# Patient Record
Sex: MALE | Race: WHITE | NOT HISPANIC OR LATINO | Employment: OTHER | URBAN - METROPOLITAN AREA
[De-identification: names, ages, dates, MRNs, and addresses within clinical notes are randomized per-mention and may not be internally consistent; named-entity substitution may affect disease eponyms.]

---

## 2018-11-05 ENCOUNTER — OFFICE VISIT (OUTPATIENT)
Dept: MEDICAL GROUP | Facility: MEDICAL CENTER | Age: 63
End: 2018-11-05
Payer: MEDICARE

## 2018-11-05 VITALS
SYSTOLIC BLOOD PRESSURE: 142 MMHG | OXYGEN SATURATION: 95 % | WEIGHT: 192.24 LBS | BODY MASS INDEX: 26.91 KG/M2 | DIASTOLIC BLOOD PRESSURE: 90 MMHG | RESPIRATION RATE: 20 BRPM | HEART RATE: 94 BPM | TEMPERATURE: 98.4 F | HEIGHT: 71 IN

## 2018-11-05 DIAGNOSIS — M79.642 PAIN IN BOTH HANDS: ICD-10-CM

## 2018-11-05 DIAGNOSIS — R01.1 MURMUR: ICD-10-CM

## 2018-11-05 DIAGNOSIS — R03.0 TRANSIENT ELEVATED BLOOD PRESSURE: ICD-10-CM

## 2018-11-05 DIAGNOSIS — Z12.11 SCREENING FOR COLORECTAL CANCER: ICD-10-CM

## 2018-11-05 DIAGNOSIS — Z13.1 SCREENING FOR DIABETES MELLITUS: ICD-10-CM

## 2018-11-05 DIAGNOSIS — Z72.89 OTHER PROBLEMS RELATED TO LIFESTYLE: ICD-10-CM

## 2018-11-05 DIAGNOSIS — K21.9 GASTROESOPHAGEAL REFLUX DISEASE WITHOUT ESOPHAGITIS: ICD-10-CM

## 2018-11-05 DIAGNOSIS — M79.641 PAIN IN BOTH HANDS: ICD-10-CM

## 2018-11-05 DIAGNOSIS — Z12.5 SCREENING FOR PROSTATE CANCER: ICD-10-CM

## 2018-11-05 DIAGNOSIS — Z12.12 SCREENING FOR COLORECTAL CANCER: ICD-10-CM

## 2018-11-05 DIAGNOSIS — Z23 NEED FOR VACCINATION: ICD-10-CM

## 2018-11-05 DIAGNOSIS — M72.0 DUPUYTREN'S CONTRACTURE: ICD-10-CM

## 2018-11-05 DIAGNOSIS — Z12.11 COLON CANCER SCREENING: ICD-10-CM

## 2018-11-05 DIAGNOSIS — Z13.6 SCREENING FOR ISCHEMIC HEART DISEASE: ICD-10-CM

## 2018-11-05 DIAGNOSIS — Z00.00 HEALTHCARE MAINTENANCE: ICD-10-CM

## 2018-11-05 PROBLEM — M79.643 HAND PAIN: Status: ACTIVE | Noted: 2018-11-05

## 2018-11-05 PROCEDURE — 90670 PCV13 VACCINE IM: CPT | Performed by: FAMILY MEDICINE

## 2018-11-05 PROCEDURE — 90715 TDAP VACCINE 7 YRS/> IM: CPT | Performed by: FAMILY MEDICINE

## 2018-11-05 PROCEDURE — 90472 IMMUNIZATION ADMIN EACH ADD: CPT | Performed by: FAMILY MEDICINE

## 2018-11-05 PROCEDURE — G0009 ADMIN PNEUMOCOCCAL VACCINE: HCPCS | Performed by: FAMILY MEDICINE

## 2018-11-05 PROCEDURE — 99204 OFFICE O/P NEW MOD 45 MIN: CPT | Mod: 25 | Performed by: FAMILY MEDICINE

## 2018-11-05 RX ORDER — RANITIDINE 150 MG/1
150 TABLET ORAL 2 TIMES DAILY
COMMUNITY
End: 2019-07-17

## 2018-11-05 ASSESSMENT — PATIENT HEALTH QUESTIONNAIRE - PHQ9: CLINICAL INTERPRETATION OF PHQ2 SCORE: 0

## 2018-11-05 NOTE — ASSESSMENT & PLAN NOTE
On exam the patient has a 2 out of 6 systolic ejection murmur that does not radiate to the carotid arteries.  There is no known chest pain, shortness of breath or palpitations.  Patient has never been told that he has a murmur

## 2018-11-05 NOTE — PROGRESS NOTES
[facility]  Progress Note  New Patient    Subjective:   Brennan Farooq is a 63 y.o. male here today with a chief complaint of hand pain. This is a new patient to me. The patient comes in alone.     Healthcare maintenance  Lipids: Ordered.  Fasting Glucose: Ordered.  Hepatitis C Screen: Ordered.  Colonoscopy: Declined.  Fit test ordered.  PSA: Risks/benefits discussed.  Ordered.    Flu vaccine: Patient declines.   Pneumonia vaccine: Prevnar 13 given 11/05/18.   Tdap: given 11/05/18.   Shingrix vaccine: Recommended, patient will get at pharmacy.    Dupuytren's contracture  Patient describes a fibrous band in both hands that sometimes causes flexion of the fourth and fifth digits.  It is not bothersome to him.    Gastroesophageal reflux disease without esophagitis  Patient has long-standing history of reflux that responds well to as needed Zantac.  He states he has had an EGD done in the past which had a negative biopsy.    Hand pain  The patient has a several month long history of bilateral hand pain localized to the DIP and PIP joints.  Sometimes he will also awaken in the morning with some numbness in his bilateral fingertips.  This resolves as the day goes on.  Symptoms are mild in severity and the patient has not tried any alleviating factors.  He cannot identify any exacerbating factors.  There is no known trauma.    Murmur  On exam the patient has a 2 out of 6 systolic ejection murmur that does not radiate to the carotid arteries.  There is no known chest pain, shortness of breath or palpitations.  Patient has never been told that he has a murmur    Transient elevated blood pressure  Several months ago the patient was told that he had a borderline blood pressure.  He does not check at home.  He denies chest pain, shortness of breath and palpitations, he does not exercise much.      No current outpatient prescriptions on file prior to visit.     No current facility-administered medications on file  "prior to visit.        Past Medical History:   Diagnosis Date   • GERD (gastroesophageal reflux disease)        Allergies: Patient has no known allergies.    Surgical History:  has no past surgical history on file.    Family History: family history includes Alcohol/Drug in his father and mother; Arthritis in his mother; Hypertension in his father.    Social History:  reports that he has never smoked. He has never used smokeless tobacco. He reports that he drinks alcohol. He reports that he does not use drugs.    ROS:   Constitutional ROS: No unexplained fevers, sweats, or chills  Eye ROS: No eye pain, redness, discharge  Ear ROS: No ear pain  Mouth/Throat ROS: No sore throat  Neck ROS: No swollen glands  Pulmonary ROS: No shortness of breath  Cardiovascular ROS: No chest pain  Gastrointestinal ROS: No abdominal pain  Musculoskeletal/Extremities ROS: No clubbing  Skin/Integumentary ROS: No evidence of rash  Neurologic ROS: No seizures  Psychiatric ROS: No depression       Objective:     Vitals:    11/05/18 0815 11/05/18 0821   BP: 142/90 142/90   Patient Position: Sitting    Pulse: 94    Resp: 20    Temp: 36.9 °C (98.4 °F)    TempSrc: Temporal    SpO2: 95%    Weight: 87.2 kg (192 lb 3.9 oz)    Height: 1.791 m (5' 10.5\")        Physical Exam:  Constitutional: Alert, no distress.  Skin: Warm, dry, good turgor, no rashes in visible areas.  Eye: Equal, round and reactive, conjunctiva clear, lids normal.  ENMT: Lips without lesions, good dentition, oropharynx clear.  Neck: Trachea midline, no masses, no thyromegaly. No cervical or supraclavicular lymphadenopathy  Respiratory: Unlabored respiratory effort, lungs clear to auscultation, no wheezes, no ronchi.  Cardiovascular: 2+ EVE loudest at aortic region.  Abdomen: Soft, non-tender, no masses, no hepatosplenomegaly.  Psych: Alert and oriented x3, normal affect and mood.  MSK: bilateral wrists with negative Phalen and Tinel sign.  Dupuytren's contracture identified " bilaterally.  Sensation intact in bilateral upper extremities.  2+ radial pulses bilaterally        Assessment and Plan:     1. Transient elevated blood pressure  - DASH diet discussed, handout given.   - f/u 3 months.     2. Gastroesophageal reflux disease without esophagitis  - continue PRN zantac.     3. Healthcare maintenance  - see HPI.     4. Dupuytren's contracture  - watchful waiting, consider hand surgery if bothersome.,     5. Pain in both hands  No trauma to warrant XR. Suspect carpal tunnel and arthritis.   - discussed hand exercises/stretching.   - carpal tunnel brace at night.   - f/u 3 months.     6. Colon cancer screening  - OCCULT BLOOD FECES IMMUNOASSAY; Future    7. Screening for ischemic heart disease  - LIPID PROFILE; Future    8. Screening for diabetes mellitus  - BASIC METABOLIC PANEL; Future    9. Screening for prostate cancer  - PROSTATE SPECIFIC AG SCREENING; Future    10. Other problems related to lifestyle  - HEP C VIRUS ANTIBODY; Future    11. Need for vaccination  - Tdap Vaccine =>6YO IM  - Pneumococcal Conjugate Vaccine 13-Valent    12. Murmur  - EC-ECHOCARDIOGRAM COMPLETE W/O CONT; Future          Followup: Return in about 3 months (around 2/5/2019).

## 2018-11-05 NOTE — ASSESSMENT & PLAN NOTE
Patient has long-standing history of reflux that responds well to as needed Zantac.  He states he has had an EGD done in the past which had a negative biopsy.

## 2018-11-05 NOTE — ASSESSMENT & PLAN NOTE
Several months ago the patient was told that he had a borderline blood pressure.  He does not check at home.  He denies chest pain, shortness of breath and palpitations, he does not exercise much.

## 2018-11-05 NOTE — ASSESSMENT & PLAN NOTE
Patient describes a fibrous band in both hands that sometimes causes flexion of the fourth and fifth digits.  It is not bothersome to him.

## 2018-11-05 NOTE — ASSESSMENT & PLAN NOTE
Lipids: Ordered.  Fasting Glucose: Ordered.  Hepatitis C Screen: Ordered.  Colonoscopy: Declined.  Fit test ordered.  PSA: Risks/benefits discussed.  Ordered.    Flu vaccine: Patient declines.   Pneumonia vaccine: Prevnar 13 given 11/05/18.   Tdap: given 11/05/18.   Shingrix vaccine: Recommended, patient will get at pharmacy.

## 2018-11-07 ENCOUNTER — HOSPITAL ENCOUNTER (OUTPATIENT)
Facility: MEDICAL CENTER | Age: 63
End: 2018-11-07
Attending: FAMILY MEDICINE
Payer: MEDICARE

## 2018-11-07 ENCOUNTER — HOSPITAL ENCOUNTER (OUTPATIENT)
Dept: LAB | Facility: MEDICAL CENTER | Age: 63
End: 2018-11-07
Attending: FAMILY MEDICINE
Payer: MEDICARE

## 2018-11-07 DIAGNOSIS — Z13.1 SCREENING FOR DIABETES MELLITUS: ICD-10-CM

## 2018-11-07 DIAGNOSIS — Z12.5 SCREENING FOR PROSTATE CANCER: ICD-10-CM

## 2018-11-07 DIAGNOSIS — Z72.89 OTHER PROBLEMS RELATED TO LIFESTYLE: ICD-10-CM

## 2018-11-07 DIAGNOSIS — Z13.6 SCREENING FOR ISCHEMIC HEART DISEASE: ICD-10-CM

## 2018-11-07 PROCEDURE — 86803 HEPATITIS C AB TEST: CPT

## 2018-11-07 PROCEDURE — 36415 COLL VENOUS BLD VENIPUNCTURE: CPT

## 2018-11-07 PROCEDURE — 80061 LIPID PANEL: CPT

## 2018-11-07 PROCEDURE — 84153 ASSAY OF PSA TOTAL: CPT

## 2018-11-07 PROCEDURE — 80048 BASIC METABOLIC PNL TOTAL CA: CPT

## 2018-11-07 PROCEDURE — 82274 ASSAY TEST FOR BLOOD FECAL: CPT

## 2018-11-08 ENCOUNTER — TELEPHONE (OUTPATIENT)
Dept: MEDICAL GROUP | Facility: MEDICAL CENTER | Age: 63
End: 2018-11-08

## 2018-11-08 DIAGNOSIS — E78.5 DYSLIPIDEMIA: ICD-10-CM

## 2018-11-08 LAB
ANION GAP SERPL CALC-SCNC: 5 MMOL/L (ref 0–11.9)
BUN SERPL-MCNC: 16 MG/DL (ref 8–22)
CALCIUM SERPL-MCNC: 10.2 MG/DL (ref 8.5–10.5)
CHLORIDE SERPL-SCNC: 104 MMOL/L (ref 96–112)
CHOLEST SERPL-MCNC: 180 MG/DL (ref 100–199)
CO2 SERPL-SCNC: 28 MMOL/L (ref 20–33)
CREAT SERPL-MCNC: 1.09 MG/DL (ref 0.5–1.4)
FASTING STATUS PATIENT QL REPORTED: NORMAL
GLUCOSE SERPL-MCNC: 107 MG/DL (ref 65–99)
HCV AB SER QL: NEGATIVE
HDLC SERPL-MCNC: 53 MG/DL
LDLC SERPL CALC-MCNC: 115 MG/DL
POTASSIUM SERPL-SCNC: 4.5 MMOL/L (ref 3.6–5.5)
PSA SERPL-MCNC: 0.1 NG/ML (ref 0–4)
SODIUM SERPL-SCNC: 137 MMOL/L (ref 135–145)
TRIGL SERPL-MCNC: 59 MG/DL (ref 0–149)

## 2018-11-08 RX ORDER — SIMVASTATIN 20 MG
20 TABLET ORAL EVERY EVENING
Qty: 90 TAB | Refills: 4 | Status: SHIPPED | OUTPATIENT
Start: 2018-11-08 | End: 2019-05-15 | Stop reason: SDUPTHER

## 2018-11-08 NOTE — TELEPHONE ENCOUNTER
Phone Number Called: [phone] (home)     Message: Spoke with Pt. And he would like to start the Statin Drug. I also informed him to start taking Aspirin 81 mg daily.  Pharmacy is on file.    Left Message for patient to call back: yes

## 2018-11-08 NOTE — TELEPHONE ENCOUNTER
Simvastatin ordered. Please advise patient to get NON-fasting labs 2-4 weeks after starting simvastatin to ensure liver remains healthy. Order placed.

## 2018-11-08 NOTE — TELEPHONE ENCOUNTER
Phone Number Called: [phone] (home)     Message: Spoke with Pt. And he verbalized his understanding with getting the labs done 2-4 weeks after starting the Simvastatin.    Left Message for patient to call back: N\A

## 2018-11-08 NOTE — TELEPHONE ENCOUNTER
----- Message from Mac Gentile M.D. sent at 11/8/2018  8:08 AM PST -----  Please call and inform this patient of the following:  His cholesterol and blood sugar is slightly up. He should continue to work on diet and exercise. I would also recommend that the patient start a baby aspirin (81 mg) daily and a statin medicine. Would he like to start this medicine?

## 2018-11-10 DIAGNOSIS — Z12.11 COLON CANCER SCREENING: ICD-10-CM

## 2018-11-15 ENCOUNTER — HOSPITAL ENCOUNTER (OUTPATIENT)
Dept: CARDIOLOGY | Facility: MEDICAL CENTER | Age: 63
End: 2018-11-15
Attending: FAMILY MEDICINE
Payer: MEDICARE

## 2018-11-15 ENCOUNTER — TELEPHONE (OUTPATIENT)
Dept: MEDICAL GROUP | Facility: MEDICAL CENTER | Age: 63
End: 2018-11-15

## 2018-11-15 DIAGNOSIS — R01.1 MURMUR: ICD-10-CM

## 2018-11-15 LAB
HEMOCCULT STL QL IA: NEGATIVE
LV EJECT FRACT  99904: 60
LV EJECT FRACT MOD 2C 99903: 60.54
LV EJECT FRACT MOD 4C 99902: 60.2
LV EJECT FRACT MOD BP 99901: 61

## 2018-11-15 PROCEDURE — 93306 TTE W/DOPPLER COMPLETE: CPT | Mod: 26 | Performed by: INTERNAL MEDICINE

## 2018-11-15 PROCEDURE — 93306 TTE W/DOPPLER COMPLETE: CPT

## 2018-11-15 NOTE — TELEPHONE ENCOUNTER
----- Message from Mac Gentile M.D. sent at 11/15/2018 11:36 AM PST -----  Please call and inform this patient of the following:  Your stool test for blood was negative. This is great news.     Please don't hesitate to call or write with any concerns.     Mac Gentile M.D.

## 2018-11-15 NOTE — TELEPHONE ENCOUNTER
Phone Number Called: [phone] (home)     Message: Pt. Has been informed of his test results. He also wanted to let you know he had an EKG done this morning at a [facility].    Left Message for patient to call back: N\A

## 2018-11-16 ENCOUNTER — TELEPHONE (OUTPATIENT)
Dept: MEDICAL GROUP | Facility: MEDICAL CENTER | Age: 63
End: 2018-11-16

## 2018-11-16 NOTE — TELEPHONE ENCOUNTER
Phone Number Called: [phone] (home)       Message: left a VM for a return call.    Left Message for patient to call back: yes

## 2018-11-16 NOTE — TELEPHONE ENCOUNTER
----- Message from Mac Gentile M.D. sent at 11/16/2018  7:54 AM PST -----  Please call and inform this patient of the following:  His echo showed moderate aortic stenosis. This is a slight narrowing of one of the valves leading out of the heart. We should repeat his echo in 6 months. Otherwise, the echo looked good.

## 2018-11-26 ENCOUNTER — HOSPITAL ENCOUNTER (OUTPATIENT)
Dept: LAB | Facility: MEDICAL CENTER | Age: 63
End: 2018-11-26
Attending: FAMILY MEDICINE
Payer: MEDICARE

## 2018-11-26 DIAGNOSIS — E78.5 DYSLIPIDEMIA: ICD-10-CM

## 2018-11-26 PROCEDURE — 36415 COLL VENOUS BLD VENIPUNCTURE: CPT

## 2018-11-26 PROCEDURE — 80076 HEPATIC FUNCTION PANEL: CPT

## 2018-11-28 ENCOUNTER — TELEPHONE (OUTPATIENT)
Dept: MEDICAL GROUP | Facility: MEDICAL CENTER | Age: 63
End: 2018-11-28

## 2018-11-28 LAB
ALBUMIN SERPL BCP-MCNC: 4.6 G/DL (ref 3.2–4.9)
ALP SERPL-CCNC: 45 U/L (ref 30–99)
ALT SERPL-CCNC: 13 U/L (ref 2–50)
AST SERPL-CCNC: 16 U/L (ref 12–45)
BILIRUB CONJ SERPL-MCNC: <0.1 MG/DL (ref 0.1–0.5)
BILIRUB INDIRECT SERPL-MCNC: NORMAL MG/DL (ref 0–1)
BILIRUB SERPL-MCNC: 0.5 MG/DL (ref 0.1–1.5)
PROT SERPL-MCNC: 7.3 G/DL (ref 6–8.2)

## 2018-11-28 NOTE — TELEPHONE ENCOUNTER
Phone Number Called: [phone] (home)     Message: spoke with pt. And informed him of his test results.    Left Message for patient to call back: yes and N\A

## 2018-11-28 NOTE — TELEPHONE ENCOUNTER
----- Message from Mac Gentile M.D. sent at 11/28/2018  7:48 AM PST -----  Please call and inform this patient of the following:  His liver function tests are normal.

## 2018-12-04 ENCOUNTER — OFFICE VISIT (OUTPATIENT)
Dept: MEDICAL GROUP | Facility: MEDICAL CENTER | Age: 63
End: 2018-12-04
Payer: MEDICARE

## 2018-12-04 VITALS
DIASTOLIC BLOOD PRESSURE: 90 MMHG | OXYGEN SATURATION: 97 % | HEART RATE: 64 BPM | TEMPERATURE: 99 F | SYSTOLIC BLOOD PRESSURE: 172 MMHG | BODY MASS INDEX: 27.27 KG/M2 | WEIGHT: 194.8 LBS | HEIGHT: 71 IN

## 2018-12-04 DIAGNOSIS — H69.93 DYSFUNCTION OF BOTH EUSTACHIAN TUBES: ICD-10-CM

## 2018-12-04 PROCEDURE — 99214 OFFICE O/P EST MOD 30 MIN: CPT | Performed by: PHYSICIAN ASSISTANT

## 2018-12-04 RX ORDER — FLUTICASONE PROPIONATE 50 MCG
1 SPRAY, SUSPENSION (ML) NASAL DAILY
Qty: 16 G | Refills: 0 | Status: SHIPPED | OUTPATIENT
Start: 2018-12-04 | End: 2019-04-01

## 2018-12-07 NOTE — PROGRESS NOTES
"Subjective:      Brennan Farooq is a 63 y.o. male who presents with Ear Fullness (ears keep popping) and Pressure Behind the Eyes            HPIPatient presents with complaint of ear fullness/pressure. Sometimes worse than others, feels like they need to pop. Not necessarily noticed changing elevation. No headache. No dizziness. No tinnitus. Unsure if there is hearing loss. No meds or treatments tried. Feels some pressure behind right and left eye. No fever/chills. No vision change. No hx of chronic sinus infections.     ROSno fever/chills. No headache/dizziness. No neck pain or stiffness. No chest pain, SOB or difficulty breathing. No n/v/d/c or abdominal pain. No rash or skin lesion. No joint swelling or redness.    Active Ambulatory Problems     Diagnosis Date Noted   • Gastroesophageal reflux disease without esophagitis 11/05/2018   • Healthcare maintenance 11/05/2018   • Dupuytren's contracture 11/05/2018   • Hand pain 11/05/2018   • Murmur 11/05/2018   • Transient elevated blood pressure 11/05/2018   • Dyslipidemia 11/08/2018     Resolved Ambulatory Problems     Diagnosis Date Noted   • No Resolved Ambulatory Problems     Past Medical History:   Diagnosis Date   • GERD (gastroesophageal reflux disease)      Current Outpatient Prescriptions   Medication Sig Dispense Refill   • fluticasone (FLONASE) 50 MCG/ACT nasal spray Spray 1 Spray in nose every day. 16 g 0   • simvastatin (ZOCOR) 20 MG Tab Take 1 Tab by mouth every evening. 90 Tab 4   • aspirin EC (ECOTRIN) 81 MG Tablet Delayed Response Take 1 Tab by mouth every day. 90 Tab 4   • raNITidine (ZANTAC) 150 MG Tab Take 150 mg by mouth 2 times a day.       No current facility-administered medications for this visit.    nkda  Non-smoker   Objective:     BP (!) 172/90 (BP Location: Right arm, Patient Position: Sitting)   Pulse 64   Temp 37.2 °C (99 °F) (Temporal)   Ht 1.791 m (5' 10.5\")   Wt 88.4 kg (194 lb 12.8 oz)   SpO2 97%   BMI 27.56 kg/m²  "     Physical Exam   Constitutional: He is oriented to person, place, and time. He appears well-developed and well-nourished. No distress.   HENT:   Head: Normocephalic and atraumatic.   Right Ear: Hearing, tympanic membrane, external ear and ear canal normal.   Left Ear: Hearing, tympanic membrane, external ear and ear canal normal.   Nose: Mucosal edema and rhinorrhea present. Right sinus exhibits no maxillary sinus tenderness and no frontal sinus tenderness. Left sinus exhibits no maxillary sinus tenderness and no frontal sinus tenderness.   Mouth/Throat: Uvula is midline and oropharynx is clear and moist.   Eyes: Pupils are equal, round, and reactive to light. Conjunctivae and EOM are normal.   Cardiovascular: Normal rate and regular rhythm.    Pulmonary/Chest: Effort normal and breath sounds normal.   Neurological: He is alert and oriented to person, place, and time.   Skin: Skin is warm and dry. No rash noted. He is not diaphoretic. No pallor.   Psychiatric: He has a normal mood and affect. His behavior is normal. Judgment and thought content normal.   Vitals reviewed.              Assessment/Plan:     1. Dysfunction of both eustachian tubes    - fluticasone (FLONASE) 50 MCG/ACT nasal spray; Spray 1 Spray in nose every day.  Dispense: 16 g; Refill: 0    F/u if symptoms persist. May need referral to ENT

## 2018-12-18 ENCOUNTER — OFFICE VISIT (OUTPATIENT)
Dept: MEDICAL GROUP | Facility: MEDICAL CENTER | Age: 63
End: 2018-12-18
Payer: MEDICARE

## 2018-12-18 VITALS
HEIGHT: 71 IN | HEART RATE: 77 BPM | RESPIRATION RATE: 18 BRPM | TEMPERATURE: 98.3 F | WEIGHT: 194.6 LBS | BODY MASS INDEX: 27.24 KG/M2 | DIASTOLIC BLOOD PRESSURE: 84 MMHG | SYSTOLIC BLOOD PRESSURE: 144 MMHG | OXYGEN SATURATION: 96 %

## 2018-12-18 DIAGNOSIS — R73.01 IMPAIRED FASTING GLUCOSE: ICD-10-CM

## 2018-12-18 DIAGNOSIS — I35.0 NONRHEUMATIC AORTIC VALVE STENOSIS: ICD-10-CM

## 2018-12-18 DIAGNOSIS — H69.93 DYSFUNCTION OF BOTH EUSTACHIAN TUBES: ICD-10-CM

## 2018-12-18 DIAGNOSIS — M79.641 PAIN IN BOTH HANDS: ICD-10-CM

## 2018-12-18 DIAGNOSIS — I10 ESSENTIAL HYPERTENSION: ICD-10-CM

## 2018-12-18 DIAGNOSIS — M79.642 PAIN IN BOTH HANDS: ICD-10-CM

## 2018-12-18 PROBLEM — R01.1 MURMUR: Status: RESOLVED | Noted: 2018-11-05 | Resolved: 2018-12-18

## 2018-12-18 PROCEDURE — 99214 OFFICE O/P EST MOD 30 MIN: CPT | Performed by: FAMILY MEDICINE

## 2018-12-18 RX ORDER — AMLODIPINE BESYLATE 2.5 MG/1
2.5 TABLET ORAL DAILY
Qty: 90 TAB | Refills: 0 | Status: SHIPPED | OUTPATIENT
Start: 2018-12-18 | End: 2019-03-12 | Stop reason: SDUPTHER

## 2018-12-18 NOTE — ASSESSMENT & PLAN NOTE
Patient describes several weeks of bilateral ear popping and sinus pressure, along with a sinus headache.  He has tried Flonase which helped sometimes and did not help others.

## 2018-12-19 NOTE — PROGRESS NOTES
[facility]  Progress Note  Established Patient    Subjective:   Brennan Farooq is a 63 y.o. male here today with a chief complaint of high blood pressure. The patient is alone.     Dysfunction of both eustachian tubes  Patient describes several weeks of bilateral ear popping and sinus pressure, along with a sinus headache.  He has tried Flonase which helped sometimes and did not help others.    Aortic stenosis  Patient has moderate aortic stenosis identified on 11/2018 echocardiogram.    Essential hypertension  Patient's blood pressure remains elevated.    Hand pain  This has improved with nighttime bracing.    Impaired fasting glucose  Noted on past labs.      Current Outpatient Prescriptions on File Prior to Visit   Medication Sig Dispense Refill   • simvastatin (ZOCOR) 20 MG Tab Take 1 Tab by mouth every evening. 90 Tab 4   • aspirin EC (ECOTRIN) 81 MG Tablet Delayed Response Take 1 Tab by mouth every day. 90 Tab 4   • raNITidine (ZANTAC) 150 MG Tab Take 150 mg by mouth 2 times a day.     • fluticasone (FLONASE) 50 MCG/ACT nasal spray Spray 1 Spray in nose every day. 16 g 0     No current facility-administered medications on file prior to visit.        Past Medical History:   Diagnosis Date   • GERD (gastroesophageal reflux disease)        Allergies: Patient has no known allergies.    Surgical History:  has no past surgical history on file.    Family History: family history includes Alcohol/Drug in his father and mother; Arthritis in his mother; Hypertension in his father.    Social History:  reports that he has never smoked. He has never used smokeless tobacco. He reports that he drinks alcohol. He reports that he does not use drugs.    ROS: no fever or nausea.        Objective:     Vitals:    12/18/18 1521   BP: 144/84   BP Location: Right arm   Patient Position: Sitting   BP Cuff Size: Large adult   Pulse: 77   Resp: 18   Temp: 36.8 °C (98.3 °F)   TempSrc: Temporal   SpO2: 96%   Weight: 88.3 kg  "(194 lb 9.6 oz)   Height: 1.791 m (5' 10.5\")       Physical Exam:  General: alert in no apparent distress.   Cardio: regular rate and rhythm, no murmurs, rubs or gallops.   Resp: CTAB no w/r/r.   ENMT: Cerumen impaction.  Status post bilateral ear lavage demonstrates intact tympanic membranes bilaterally.  No pharyngeal erythema, no tonsillar exudates.  Uvula midline.        Assessment and Plan:     1. Dysfunction of both eustachian tubes  Patient's description is most consistent with eustachian tube dysfunction and sinusitis.  Recommended Neti pot and Nasacort and we will reevaluate.  Patient will call with new or worsening symptoms.    2. Essential hypertension  Starting medicine.   - amLODIPine (NORVASC) 2.5 MG Tab; Take 1 Tab by mouth every day.  Dispense: 90 Tab; Refill: 0    3. Nonrheumatic aortic valve stenosis  - repeat echo 5/2019.     4. Impaired fasting glucose  - diet and exercise.     5. Pain in both hands  - improved.        Followup: Return in about 6 weeks (around 1/29/2019), or if symptoms worsen or fail to improve, for HTN.         "

## 2018-12-27 ENCOUNTER — TELEPHONE (OUTPATIENT)
Dept: MEDICAL GROUP | Facility: MEDICAL CENTER | Age: 63
End: 2018-12-27

## 2018-12-27 NOTE — TELEPHONE ENCOUNTER
Phone Number Called: [phone] (home)     Message: Pt. Called wanting to know if her can still take his Aspirin 81 mg. Checked notes and it looks like nothing was stated for pt. To stop Aspirin.    Left Message for patient to call back: N\A

## 2018-12-31 NOTE — TELEPHONE ENCOUNTER
In his circumstances, the benefits of baby aspirin daily probably outweigh the risks; therefore, I would recommend he continue baby aspirin daily with food.

## 2019-01-14 ENCOUNTER — PATIENT OUTREACH (OUTPATIENT)
Dept: HEALTH INFORMATION MANAGEMENT | Facility: OTHER | Age: 64
End: 2019-01-14

## 2019-01-14 NOTE — PROGRESS NOTES
Pt called and the [facility] appt was rescheduled for a 40 minutes appt. Pt stated that he will schedule the AWV after his FV. Not able to go over more info. Pt was in a hurry.

## 2019-03-12 DIAGNOSIS — I10 ESSENTIAL HYPERTENSION: ICD-10-CM

## 2019-03-12 RX ORDER — AMLODIPINE BESYLATE 2.5 MG/1
2.5 TABLET ORAL DAILY
Qty: 90 TAB | Refills: 0 | Status: SHIPPED | OUTPATIENT
Start: 2019-03-12 | End: 2019-06-05 | Stop reason: SDUPTHER

## 2019-03-21 ENCOUNTER — TELEPHONE (OUTPATIENT)
Dept: MEDICAL GROUP | Facility: MEDICAL CENTER | Age: 64
End: 2019-03-21

## 2019-03-21 NOTE — TELEPHONE ENCOUNTER
ESTABLISHED PATIENT PRE-VISIT PLANNING     Patient was NOT contacted to complete PVP.     Note: Patient will not be contacted if there is no indication to call.     1.  Reviewed notes from the last few office visits within the medical group: Yes    2.  If any orders were placed at last visit or intended to be done for this visit (i.e. 6 mos follow-up), do we have Results/Consult Notes?        •  Labs - Labs were not ordered at last office visit.   Note: If patient appointment is for lab review and patient did not complete labs, check with provider if OK to reschedule patient until labs completed.       •  Imaging - Imaging was not ordered at last office visit.       •  Referrals - No referrals were ordered at last office visit.    3. Is this appointment scheduled as a Hospital Follow-Up? No    4.  Immunizations were updated in Epic using WebIZ?: Epic matches WebIZ       •  Web Iz Recommendations: FLU, TD, VARICELLA (Chicken Pox)  and SHINGRIX (Shingles)    5.  Patient is due for the following Health Maintenance Topics:   Health Maintenance Due   Topic Date Due   • Annual Wellness Visit  1955   • IMM ZOSTER VACCINES (1 of 2) 10/01/2003   • IMM INFLUENZA (1) 09/01/2018       - Patient is up-to-date on all Health Maintenance topics. No records have been requested at this time.    6. Orders for overdue Health Maintenance topics pended in Pre-Charting? NO    7.  AHA (MDX) form printed for Provider? YES    8.  Patient was NOT informed to arrive 15 min prior to their scheduled appointment and bring in their medication bottles.

## 2019-04-01 ENCOUNTER — OFFICE VISIT (OUTPATIENT)
Dept: MEDICAL GROUP | Facility: MEDICAL CENTER | Age: 64
End: 2019-04-01
Payer: MEDICARE

## 2019-04-01 VITALS
OXYGEN SATURATION: 97 % | WEIGHT: 197 LBS | BODY MASS INDEX: 27.58 KG/M2 | RESPIRATION RATE: 18 BRPM | DIASTOLIC BLOOD PRESSURE: 72 MMHG | HEART RATE: 75 BPM | HEIGHT: 71 IN | TEMPERATURE: 98.2 F | SYSTOLIC BLOOD PRESSURE: 132 MMHG

## 2019-04-01 DIAGNOSIS — E78.5 DYSLIPIDEMIA: ICD-10-CM

## 2019-04-01 DIAGNOSIS — M54.2 NECK PAIN: ICD-10-CM

## 2019-04-01 DIAGNOSIS — Z13.6 SCREENING FOR ISCHEMIC HEART DISEASE: ICD-10-CM

## 2019-04-01 DIAGNOSIS — Z12.5 SCREENING FOR PROSTATE CANCER: ICD-10-CM

## 2019-04-01 DIAGNOSIS — I10 ESSENTIAL HYPERTENSION: ICD-10-CM

## 2019-04-01 DIAGNOSIS — Z13.1 SCREENING FOR DIABETES MELLITUS: ICD-10-CM

## 2019-04-01 DIAGNOSIS — I35.0 NONRHEUMATIC AORTIC VALVE STENOSIS: ICD-10-CM

## 2019-04-01 DIAGNOSIS — H69.93 DYSFUNCTION OF BOTH EUSTACHIAN TUBES: ICD-10-CM

## 2019-04-01 PROCEDURE — 99214 OFFICE O/P EST MOD 30 MIN: CPT | Mod: 25 | Performed by: FAMILY MEDICINE

## 2019-04-01 PROCEDURE — 8041 PR SCP AHA: Performed by: FAMILY MEDICINE

## 2019-04-01 ASSESSMENT — PATIENT HEALTH QUESTIONNAIRE - PHQ9: CLINICAL INTERPRETATION OF PHQ2 SCORE: 0

## 2019-04-01 NOTE — ASSESSMENT & PLAN NOTE
"Patient states that he has had several months of diffuse neck pain that is worse on the right.  He describes some popping and clicking with neck rotation.  He describes the pain as \"tightness\".  It comes and goes.  He has not really tried anything for it.  "

## 2019-04-01 NOTE — PROGRESS NOTES
"Subjective:     Brennan Farooq is a 63 y.o. male here today for neck pain and Annual Health Assessment.    Neck pain  Patient states that he has had several months of diffuse neck pain that is worse on the right.  He describes some popping and clicking with neck rotation.  He describes the pain as \"tightness\".  It comes and goes.  He has not really tried anything for it.    Aortic stenosis  Patient has moderate aortic stenosis identified on 11/2018 echocardiogram.    Dysfunction of both eustachian tubes  The patient's eustachian tube dysfunction is improving, however, he does not like to use Flonase and instead elects to use chewing gum which worked well for him.    Dyslipidemia  Patient's dyslipidemia is controlled with simvastatin.    Essential hypertension  Patient's blood pressure is at goal on amlodipine.      Health Maintenance Summary                Annual Wellness Visit Overdue 1955     IMM ZOSTER VACCINES Overdue 10/1/2003     IMM INFLUENZA Next Due 9/1/2019     IMM PNEUMOCOCCAL 65+ (ADULT) LOW/MEDIUM RISK SERIES Next Due 11/5/2019      Done 11/5/2018 Imm Admin: Pneumococcal Conjugate Vaccine (Prevnar/PCV-13)    COLON CANCER SCREENING ANNUAL FIT Next Due 11/7/2019      Done 11/7/2018 OCCULT BLOOD FECES IMMUNOASSAY    IMM DTaP/Tdap/Td Vaccine Next Due 11/5/2028      Done 11/5/2018 Imm Admin: Tdap Vaccine          Annual Health Assessment Questions:     1.  Are you currently engaging in any exercise or physical activity? Yes    2.  How would you describe your mood or emotional well-being today? good    3.  Have you had any falls in the last year? No    4.  Have you noticed any problems with your balance or had difficulty walking? No    5.  In the last six months have you experienced any leakage of urine? No    6. DPA/Advanced Directive: Patient does not have an Advanced Directive.  A packet and workshop information was given on Advanced Directives.    Current medicines (including changes " "today)  Current Outpatient Prescriptions   Medication Sig Dispense Refill   • amLODIPine (NORVASC) 2.5 MG Tab Take 1 Tab by mouth every day. 90 Tab 0   • simvastatin (ZOCOR) 20 MG Tab Take 1 Tab by mouth every evening. 90 Tab 4   • aspirin EC (ECOTRIN) 81 MG Tablet Delayed Response Take 1 Tab by mouth every day. 90 Tab 4   • raNITidine (ZANTAC) 150 MG Tab Take 150 mg by mouth 2 times a day.       No current facility-administered medications for this visit.        He  has a past medical history of GERD (gastroesophageal reflux disease).    Patient has no known allergies.    He  reports that he has never smoked. He has never used smokeless tobacco. He reports that he drinks alcohol. He reports that he does not use drugs.  Counseling given: Not Answered      ROS   No chest pain, no shortness of breath,no dizziness.     Objective:     Physical Exam:  Blood pressure 132/72, pulse 75, temperature 36.8 °C (98.2 °F), temperature source Temporal, resp. rate 18, height 1.791 m (5' 10.5\"), weight 89.4 kg (197 lb), SpO2 97 %. Body mass index is 27.87 kg/m².   Constitutional: Alert, no distress.  Neck: No tenderness to palpation over the spine.  Spurling's testing negative bilaterally.  Respiratory: Unlabored respiratory effort, lungs clear to auscultation, no wheezes, no rhonchi.  Cardiovascular: Normal S1, S2, no murmur, no edema.      Assessment and Plan:     1. Nonrheumatic aortic valve stenosis  - EC-ECHOCARDIOGRAM COMPLETE W/O CONT; Future    2. Dyslipidemia  - continue statin.  - Lipid Profile; Future    3. Essential hypertension  - continue current regimen.   - Comp Metabolic Panel; Future    4. Neck pain  Exam normal.   - recommended yoga, call if not better within 6 weeks as we could then try PT or acupuncture. If this persists after would then consider imaging.     5. Dysfunction of both eustachian tubes  - improving.     6. Screening for prostate cancer  - PROSTATE SPECIFIC AG SCREENING; Future            Discussion " today about general wellness and lifestyle habits:    · Engage in regular physical activity and social activities.  · Prevent falls and reduce trip hazards; using ambulatory aides, hearing and vision testing if appropriate.  · Steps to improve urinary incontinence.  · Advanced care planning.    Follow-Up: Return in about 6 months (around 10/1/2019), or if symptoms worsen or fail to improve.         PLEASE NOTE: This dictation was created using voice recognition software. I have made every reasonable attempt to correct obvious errors, but I expect that there are errors of grammar and possibly content that I did not discover before finalizing the note.

## 2019-04-01 NOTE — ASSESSMENT & PLAN NOTE
The patient's eustachian tube dysfunction is improving, however, he does not like to use Flonase and instead elects to use chewing gum which worked well for him.

## 2019-05-01 ENCOUNTER — HOSPITAL ENCOUNTER (OUTPATIENT)
Dept: CARDIOLOGY | Facility: MEDICAL CENTER | Age: 64
End: 2019-05-01
Attending: FAMILY MEDICINE
Payer: MEDICARE

## 2019-05-01 DIAGNOSIS — I35.0 NONRHEUMATIC AORTIC VALVE STENOSIS: ICD-10-CM

## 2019-05-01 LAB
LV EJECT FRACT  99904: 60
LV EJECT FRACT MOD 2C 99903: 62.8
LV EJECT FRACT MOD 4C 99902: 75.85
LV EJECT FRACT MOD BP 99901: 70.04

## 2019-05-01 PROCEDURE — 93306 TTE W/DOPPLER COMPLETE: CPT

## 2019-05-01 PROCEDURE — 93306 TTE W/DOPPLER COMPLETE: CPT | Mod: 26 | Performed by: INTERNAL MEDICINE

## 2019-05-02 ENCOUNTER — PATIENT MESSAGE (OUTPATIENT)
Dept: MEDICAL GROUP | Facility: MEDICAL CENTER | Age: 64
End: 2019-05-02

## 2019-05-02 DIAGNOSIS — I71.21 ASCENDING AORTIC ANEURYSM (HCC): ICD-10-CM

## 2019-05-02 DIAGNOSIS — I35.0 AORTIC VALVE STENOSIS, ETIOLOGY OF CARDIAC VALVE DISEASE UNSPECIFIED: ICD-10-CM

## 2019-05-02 DIAGNOSIS — I35.0 NONRHEUMATIC AORTIC VALVE STENOSIS: ICD-10-CM

## 2019-05-07 NOTE — ASSESSMENT & PLAN NOTE
The patient has a several month long history of bilateral hand pain localized to the DIP and PIP joints.  Sometimes he will also awaken in the morning with some numbness in his bilateral fingertips.  This resolves as the day goes on.  Symptoms are mild in severity and the patient has not tried any alleviating factors.  He cannot identify any exacerbating factors.  There is no known trauma.   (4) walks frequently

## 2019-05-16 RX ORDER — SIMVASTATIN 20 MG
20 TABLET ORAL EVERY EVENING
Qty: 100 TAB | Refills: 4 | Status: SHIPPED | OUTPATIENT
Start: 2019-05-16 | End: 2020-06-29

## 2019-07-15 ENCOUNTER — TELEPHONE (OUTPATIENT)
Dept: CARDIOLOGY | Facility: MEDICAL CENTER | Age: 64
End: 2019-07-15

## 2019-07-15 NOTE — TELEPHONE ENCOUNTER
Left voicemail for patient on upcoming appt. With GILBERTO 7/17/19. Need to know if patient has had recent labs, EKG, or has ever seen a cardiologist; and if so where?

## 2019-07-16 ENCOUNTER — TELEPHONE (OUTPATIENT)
Dept: CARDIOLOGY | Facility: MEDICAL CENTER | Age: 64
End: 2019-07-16

## 2019-07-16 NOTE — TELEPHONE ENCOUNTER
Nidhi Ryan, Med Ass't  Alissa Da Silva, Med Ass't   Cc: Josie Barrios, Med Ass't   Phone Number: [phone]             Pt returned your call. Pt has not had recent labs/EKG done. No prior cardiac testing or cardiologist.

## 2019-07-16 NOTE — TELEPHONE ENCOUNTER
LVM for patient about upcoming appointment with GILBERTO 7-. I needed to know if patient has ever seen a cardiologist, if he has had a recent EKG, labs etc.

## 2019-07-17 ENCOUNTER — OFFICE VISIT (OUTPATIENT)
Dept: CARDIOLOGY | Facility: MEDICAL CENTER | Age: 64
End: 2019-07-17
Payer: MEDICARE

## 2019-07-17 VITALS
HEIGHT: 71 IN | OXYGEN SATURATION: 96 % | DIASTOLIC BLOOD PRESSURE: 88 MMHG | WEIGHT: 196 LBS | HEART RATE: 72 BPM | BODY MASS INDEX: 27.44 KG/M2 | SYSTOLIC BLOOD PRESSURE: 148 MMHG

## 2019-07-17 DIAGNOSIS — I10 ESSENTIAL HYPERTENSION: ICD-10-CM

## 2019-07-17 DIAGNOSIS — Z91.89 OTHER SPECIFIED PERSONAL RISK FACTORS, NOT ELSEWHERE CLASSIFIED: ICD-10-CM

## 2019-07-17 DIAGNOSIS — E78.5 DYSLIPIDEMIA: ICD-10-CM

## 2019-07-17 DIAGNOSIS — I35.0 MODERATE AORTIC STENOSIS: ICD-10-CM

## 2019-07-17 LAB — EKG IMPRESSION: NORMAL

## 2019-07-17 PROCEDURE — 93000 ELECTROCARDIOGRAM COMPLETE: CPT | Performed by: INTERNAL MEDICINE

## 2019-07-17 PROCEDURE — 99204 OFFICE O/P NEW MOD 45 MIN: CPT | Performed by: INTERNAL MEDICINE

## 2019-07-17 RX ORDER — AMLODIPINE BESYLATE 5 MG/1
2.5 TABLET ORAL
Qty: 100 TAB | Refills: 3 | Status: SHIPPED | OUTPATIENT
Start: 2019-07-17 | End: 2020-01-15 | Stop reason: SDUPTHER

## 2019-07-17 ASSESSMENT — ENCOUNTER SYMPTOMS
BRUISES/BLEEDS EASILY: 1
NECK PAIN: 1

## 2019-07-17 NOTE — PATIENT INSTRUCTIONS
Please increase your norvasc (also called amlodipine) to 5mg once a day and let me know if after 1 week your blood pressure is consistently >130/85.     Please schedule your coronary calcium score (CT scan).     Please walk or other aerobic exercise 30 minutes 5 days a week.

## 2019-07-17 NOTE — PROGRESS NOTES
Cardiology Initial Consultation Note    Date of note:    7/17/2019    Primary Care Provider: Mac Gentile M.D.  Referring Provider: Mac Gentile M.D.     Patient Name: Brennan Farooq   YOB: 1955  MRN:              [id number]    Chief Complaint: aortic stenosis.     History of Present Illness: Brennan Farooq Jr. is a 63 y.o. male whose current medical problems include hypertension, dyslipidemia, and moderate aortic stenosis who is here for cardiac consultation for aortic stenosis.    Started walking 30 minutes 2-3 times a week.     Patient denies chest pain, palpitations, dyspnea on exertion, pre-syncope, syncope, lower extremity swelling, orthopnea, PND or recent weight gain.           Review of Systems   HENT: Positive for tinnitus.    Hematologic/Lymphatic: Bruises/bleeds easily.   Skin: Positive for rash.   Musculoskeletal: Positive for joint pain and neck pain.     All other systems reviewed and discussed using a comprehensive questionnaire and are negative.       Past Medical History:   Diagnosis Date   • Dyslipidemia    • GERD (gastroesophageal reflux disease)    • Hypertension    • Moderate aortic stenosis          History reviewed. No pertinent surgical history. no previous surgeries.       Current Outpatient Prescriptions   Medication Sig Dispense Refill   • Famotidine (PEPCID PO) Take  by mouth.     • amLODIPine (NORVASC) 5 MG Tab Take 0.5 Tabs by mouth every day. 100 Tab 3   • simvastatin (ZOCOR) 20 MG Tab Take 1 Tab by mouth every evening. 100 Tab 4   • aspirin EC (ECOTRIN) 81 MG Tablet Delayed Response Take 1 Tab by mouth every day. 90 Tab 4     No current facility-administered medications for this visit.          No Known Allergies      Family History   Problem Relation Age of Onset   • Arthritis Mother    • Alcohol/Drug Mother    • Alcohol/Drug Father    • Hypertension Father    • Other Father 46        brain aneurysm rupture   • Heart Disease Neg Hx           "        Social History     Social History   • Marital status:      Spouse name: N/A   • Number of children: N/A   • Years of education: N/A     Occupational History   • Not on file.     Social History Main Topics   • Smoking status: Never Smoker   • Smokeless tobacco: Never Used   • Alcohol use 0.6 oz/week     1 Standard drinks or equivalent per week      Comment: Rare   • Drug use: No   • Sexual activity: Yes      Comment: Businessman     Other Topics Concern   • Not on file     Social History Narrative    Self-employed window coverings.          Physical Exam:  Ambulatory Vitals  /88 (BP Location: Left arm, Patient Position: Sitting, BP Cuff Size: Adult)   Pulse 72   Ht 1.791 m (5' 10.5\")   Wt 88.9 kg (196 lb)   SpO2 96%    Oxygen Therapy:  Pulse Oximetry: 96 %  BP Readings from Last 4 Encounters:   07/17/19 148/88   04/01/19 132/72   12/18/18 144/84   12/04/18 (!) 172/90       Weight/BMI: Body mass index is 27.73 kg/m².  Wt Readings from Last 4 Encounters:   07/17/19 88.9 kg (196 lb)   04/01/19 89.4 kg (197 lb)   12/18/18 88.3 kg (194 lb 9.6 oz)   12/04/18 88.4 kg (194 lb 12.8 oz)       General: Well appearing and in no apparent distress  Eyes: nl conjunctiva  ENT: OP clear, normal external appearance of nose and ears  Neck: JVP 4 cm H2O  Lungs: normal respiratory effort, CTAB  Heart: RRR, 2/6 early peaking systolic murmur at RUSB radiating to bilateral carotids, no rubs or gallops, no edema bilateral lower extremities. No LV/RV heave on cardiac palpatation. 2+ bilateral radial pulses.  2+ bilateral dp pulses.   Abdomen: soft, non tender, non distended, no masses, normal bowel sounds.  No HSM.  Extremities/MSK: no clubbing, no cyanosis  Neurological: No focal sensory deficits  Psychiatric: Appropriate affect, A/O x 3, intact judgement and insight  Skin: Warm extremities      Lab Data Review:  Lab Results   Component Value Date/Time    CHOLSTRLTOT 180 11/07/2018 06:05 AM     (H) 11/07/2018 " 06:05 AM    HDL 53 11/07/2018 06:05 AM    TRIGLYCERIDE 59 11/07/2018 06:05 AM       Lab Results   Component Value Date/Time    SODIUM 137 11/07/2018 06:05 AM    POTASSIUM 4.5 11/07/2018 06:05 AM    CHLORIDE 104 11/07/2018 06:05 AM    CO2 28 11/07/2018 06:05 AM    GLUCOSE 107 (H) 11/07/2018 06:05 AM    BUN 16 11/07/2018 06:05 AM    CREATININE 1.09 11/07/2018 06:05 AM     Lab Results   Component Value Date/Time    ALKPHOSPHAT 45 11/26/2018 06:07 AM    ASTSGOT 16 11/26/2018 06:07 AM    ALTSGPT 13 11/26/2018 06:07 AM    TBILIRUBIN 0.5 11/26/2018 06:07 AM      No results found for: WBC  No components found for: HBGA1C  No components found for: TROPONIN  No components found for: BNP      Cardiac Imaging and Procedures Review:    EKG dated 7/17/2019: My personal interpretation is NSR, artifact, otherwise normal     Echo dated 5/1/2019:   FINDINGS  Left Ventricle  Normal left ventricular chamber size. Mild concentric left ventricular   hypertrophy. Normal left ventricular systolic function. Left   ventricular ejection fraction is visually estimated to be 60%. Normal   regional wall motion. Normal diastolic function.    Right Ventricle  Normal right ventricular size and systolic function.    Right Atrium  Normal right atrial size. Normal inferior vena cava size and   inspiratory collapse.    Left Atrium  Normal left atrial size.    Mitral Valve  Structurally normal mitral valve. No mitral stenosis. No mitral   regurgitation.    Aortic Valve  Moderate aortic sclerosis. Tricuspid aortic valve.  Vmax is 3.25  m/s.    Transvalvular gradients are - Peak: 42 mmHg, Mean: 26 mmHg. Mild aortic   insufficiency. Pressure half time is 660   msec.    Tricuspid Valve  Structurally normal tricuspid valve. No tricuspid stenosis. Trace   tricuspid regurgitation. Unable to estimate pulmonary artery pressure   due to an inadequate tricuspid regurgitant jet.    Pulmonic Valve  The pulmonic valve is not well visualized. Trace pulmonic    insufficiency.    Pericardium  Normal pericardium without effusion. Fat pad present.    Aorta  The aortic root is normal.  Ascending aorta is dilated with a diameter   of  4.1 cm.        Medical Decision Makin. Moderate aortic stenosis  Currently no symptoms. Discussed ED/clinic precautions.   -monitor with echocardiogram Q1 year.     2. Essential hypertension  Poorly controlled  -increase norvasc to 5mg PO daily, he will contact me if not at goal and will try 10mg PO daily. Would add losartan as second agent if necessary.     3. Dyslipidemia  check CCS, if >100, will start aspirin 81mg PO daily and switch simvastatin to lipitor 20mg PO daily.  If <100, will recheck in 5 year.  If 0, no need for recheck, and no need for aspirin, or statin.           Return in about 1 year (around 2020).      Ric Figueredo MD, [facility]  [facility], [address]  [address] [address]  Phone: [phone]  Fax: [phone]

## 2019-07-25 ENCOUNTER — HOSPITAL ENCOUNTER (OUTPATIENT)
Dept: RADIOLOGY | Facility: MEDICAL CENTER | Age: 64
End: 2019-07-25
Attending: INTERNAL MEDICINE
Payer: COMMERCIAL

## 2019-07-25 DIAGNOSIS — Z91.89 OTHER SPECIFIED PERSONAL RISK FACTORS, NOT ELSEWHERE CLASSIFIED: ICD-10-CM

## 2019-07-25 PROCEDURE — 4410556 CT-CARDIAC SCORING

## 2019-08-05 ENCOUNTER — TELEPHONE (OUTPATIENT)
Dept: CARDIOLOGY | Facility: MEDICAL CENTER | Age: 64
End: 2019-08-05

## 2019-08-05 NOTE — TELEPHONE ENCOUNTER
Spoke with pt via phone, advised that Dr. Figueredo was forwarded his testing result and will be able to give recommendations when he reads it. Pt verbalized acknowledgment, no further questions/concerns.

## 2019-08-06 ENCOUNTER — TELEPHONE (OUTPATIENT)
Dept: CARDIOLOGY | Facility: MEDICAL CENTER | Age: 64
End: 2019-08-06

## 2019-08-06 NOTE — TELEPHONE ENCOUNTER
Small amount of calcium build up indicating small amount of cholesterol in the coronary arteries. Can stop aspirin as likely no benefit to Brennan at this time.  Would continue simvastatin, will recheck calcium score in 5 years to check for progression. Thanks!

## 2019-08-06 NOTE — TELEPHONE ENCOUNTER
Spoke with pt via phone regarding Dr. Figueredo's recommendations. Pt verbalized understanding of stop taking Aspirin and continue simvastatin. No further questions/concerns per pt. Pt appreciative of call.

## 2019-09-30 ENCOUNTER — HOSPITAL ENCOUNTER (OUTPATIENT)
Dept: LAB | Facility: MEDICAL CENTER | Age: 64
End: 2019-09-30
Attending: FAMILY MEDICINE
Payer: MEDICARE

## 2019-09-30 DIAGNOSIS — Z12.5 SCREENING FOR PROSTATE CANCER: ICD-10-CM

## 2019-09-30 DIAGNOSIS — I10 ESSENTIAL HYPERTENSION: ICD-10-CM

## 2019-09-30 DIAGNOSIS — E78.5 DYSLIPIDEMIA: ICD-10-CM

## 2019-09-30 LAB
ALBUMIN SERPL BCP-MCNC: 4.6 G/DL (ref 3.2–4.9)
ALBUMIN/GLOB SERPL: 1.8 G/DL
ALP SERPL-CCNC: 43 U/L (ref 30–99)
ALT SERPL-CCNC: 20 U/L (ref 2–50)
ANION GAP SERPL CALC-SCNC: 7 MMOL/L (ref 0–11.9)
AST SERPL-CCNC: 25 U/L (ref 12–45)
BILIRUB SERPL-MCNC: 0.5 MG/DL (ref 0.1–1.5)
BUN SERPL-MCNC: 22 MG/DL (ref 8–22)
CALCIUM SERPL-MCNC: 9.3 MG/DL (ref 8.5–10.5)
CHLORIDE SERPL-SCNC: 108 MMOL/L (ref 96–112)
CHOLEST SERPL-MCNC: 158 MG/DL (ref 100–199)
CO2 SERPL-SCNC: 25 MMOL/L (ref 20–33)
CREAT SERPL-MCNC: 1.09 MG/DL (ref 0.5–1.4)
FASTING STATUS PATIENT QL REPORTED: NORMAL
GLOBULIN SER CALC-MCNC: 2.6 G/DL (ref 1.9–3.5)
GLUCOSE SERPL-MCNC: 105 MG/DL (ref 65–99)
HDLC SERPL-MCNC: 49 MG/DL
LDLC SERPL CALC-MCNC: 88 MG/DL
POTASSIUM SERPL-SCNC: 4.4 MMOL/L (ref 3.6–5.5)
PROT SERPL-MCNC: 7.2 G/DL (ref 6–8.2)
PSA SERPL-MCNC: 0.1 NG/ML (ref 0–4)
SODIUM SERPL-SCNC: 140 MMOL/L (ref 135–145)
TRIGL SERPL-MCNC: 105 MG/DL (ref 0–149)

## 2019-09-30 PROCEDURE — 84153 ASSAY OF PSA TOTAL: CPT

## 2019-09-30 PROCEDURE — 80061 LIPID PANEL: CPT

## 2019-09-30 PROCEDURE — 80053 COMPREHEN METABOLIC PANEL: CPT

## 2019-09-30 PROCEDURE — 36415 COLL VENOUS BLD VENIPUNCTURE: CPT

## 2019-10-03 ENCOUNTER — OFFICE VISIT (OUTPATIENT)
Dept: MEDICAL GROUP | Facility: MEDICAL CENTER | Age: 64
End: 2019-10-03
Payer: MEDICARE

## 2019-10-03 VITALS
TEMPERATURE: 98 F | BODY MASS INDEX: 27.54 KG/M2 | OXYGEN SATURATION: 100 % | HEART RATE: 85 BPM | DIASTOLIC BLOOD PRESSURE: 64 MMHG | HEIGHT: 70 IN | RESPIRATION RATE: 16 BRPM | WEIGHT: 192.4 LBS | SYSTOLIC BLOOD PRESSURE: 112 MMHG

## 2019-10-03 DIAGNOSIS — Z12.11 COLON CANCER SCREENING: ICD-10-CM

## 2019-10-03 DIAGNOSIS — M79.641 BILATERAL HAND PAIN: ICD-10-CM

## 2019-10-03 DIAGNOSIS — I10 ESSENTIAL HYPERTENSION: ICD-10-CM

## 2019-10-03 DIAGNOSIS — I35.0 MODERATE AORTIC STENOSIS: ICD-10-CM

## 2019-10-03 DIAGNOSIS — M54.2 NECK PAIN: ICD-10-CM

## 2019-10-03 DIAGNOSIS — E78.5 DYSLIPIDEMIA: ICD-10-CM

## 2019-10-03 DIAGNOSIS — Z00.00 HEALTHCARE MAINTENANCE: ICD-10-CM

## 2019-10-03 DIAGNOSIS — M79.642 BILATERAL HAND PAIN: ICD-10-CM

## 2019-10-03 DIAGNOSIS — R73.01 IMPAIRED FASTING GLUCOSE: ICD-10-CM

## 2019-10-03 PROCEDURE — 99214 OFFICE O/P EST MOD 30 MIN: CPT | Performed by: FAMILY MEDICINE

## 2019-10-03 NOTE — PROGRESS NOTES
[facility]  Progress Note  Established Patient    Subjective:   Brennan Farooq Jr. is a 64 y.o. male here today with a chief complaint of HTN. The patient is alone.     Essential hypertension  Patient's blood pressure is at goal on his current medication regimen.    Dyslipidemia  Patient's dyslipidemia is controlled with simvastatin.    Moderate aortic stenosis  Patient is now following with cardiology for this issue and getting yearly echocardiograms.     Impaired fasting glucose  Noted on past labs.    Neck pain  This has resolved.    Bilateral hand pain  Patient describes DIP and PIP joint pain with numbness in the first 3 digits.  This has occurred more frequently lately because he has been doing more work with his hands.  He does have wrist braces at home but has not been wearing them.  He denies neck pain.      Current Outpatient Medications on File Prior to Visit   Medication Sig Dispense Refill   • Famotidine (PEPCID PO) Take  by mouth.     • amLODIPine (NORVASC) 5 MG Tab Take 0.5 Tabs by mouth every day. (Patient taking differently: Take 5 mg by mouth every day.) 100 Tab 3   • simvastatin (ZOCOR) 20 MG Tab Take 1 Tab by mouth every evening. 100 Tab 4     No current facility-administered medications on file prior to visit.        Past Medical History:   Diagnosis Date   • Dyslipidemia    • GERD (gastroesophageal reflux disease)    • Hypertension    • Moderate aortic stenosis        Allergies: Patient has no known allergies.    Surgical History:  has no past surgical history on file.    Family History: family history includes Alcohol/Drug in his father and mother; Arthritis in his mother; Hypertension in his father; Other (age of onset: 46) in his father.    Social History:  reports that he has never smoked. He has never used smokeless tobacco. He reports that he drinks about 0.6 oz of alcohol per week. He reports that he does not use drugs.    ROS: no CP or SOB.        Objective:     Vitals:  "   10/03/19 1502   BP: 112/64   BP Location: Left arm   Patient Position: Sitting   BP Cuff Size: Adult   Pulse: 85   Resp: 16   Temp: 36.7 °C (98 °F)   TempSrc: Temporal   SpO2: 100%   Weight: 87.3 kg (192 lb 6.4 oz)   Height: 1.778 m (5' 10\")       Physical Exam:  General: alert in no apparent distress.   Bilateral wrists: Positive Tinel sign on the right, negative Tinel sign on the left.  Negative Phalen sign bilaterally.  Patient does demonstrate some DIP joint nodules.         Assessment and Plan:     1. Healthcare maintenance  - see HPI.     2. Essential hypertension  - continue current regimen.     3. Dyslipidemia  - continue statin.     4. Impaired fasting glucose  - discussed diet and exercise.     5. Colon cancer screening  - OCCULT BLOOD FECES IMMUNOASSAY; Future    6. Moderate aortic stenosis  - f/u cardiology.    7. Neck pain   - resolved.    8. Bilateral hand pain  Patient presents with evidence of osteoarthritis and carpal tunnel syndrome.  I did offer a joint survey and EMG but he declines and would like to try to treat supportively.  He will try wrist splinting and let me know if his condition does not improve.        Followup: Return in about 9 months (around 7/3/2020), or if symptoms worsen or fail to improve, for Wellness Visit, Long.         "

## 2019-10-03 NOTE — ASSESSMENT & PLAN NOTE
Patient describes DIP and PIP joint pain with numbness in the first 3 digits.  This has occurred more frequently lately because he has been doing more work with his hands.  He does have wrist braces at home but has not been wearing them.  He denies neck pain.

## 2020-01-15 DIAGNOSIS — I10 ESSENTIAL HYPERTENSION: ICD-10-CM

## 2020-01-15 RX ORDER — AMLODIPINE BESYLATE 5 MG/1
5 TABLET ORAL
Qty: 100 TAB | Refills: 3 | Status: SHIPPED | OUTPATIENT
Start: 2020-01-15 | End: 2020-08-26

## 2020-01-15 NOTE — TELEPHONE ENCOUNTER
Was the patient seen in the last year in this department? Yes   Does patient have an active prescription for medications requested? Yes  Received Request Via: Patient

## 2020-06-29 RX ORDER — SIMVASTATIN 20 MG
TABLET ORAL
Qty: 100 TAB | Refills: 0 | Status: SHIPPED | OUTPATIENT
Start: 2020-06-29 | End: 2020-08-26

## 2020-08-26 ENCOUNTER — OFFICE VISIT (OUTPATIENT)
Dept: CARDIOLOGY | Facility: MEDICAL CENTER | Age: 65
End: 2020-08-26
Payer: MEDICARE

## 2020-08-26 VITALS
HEART RATE: 64 BPM | OXYGEN SATURATION: 97 % | SYSTOLIC BLOOD PRESSURE: 138 MMHG | BODY MASS INDEX: 25.48 KG/M2 | DIASTOLIC BLOOD PRESSURE: 64 MMHG | WEIGHT: 182 LBS | HEIGHT: 71 IN

## 2020-08-26 DIAGNOSIS — I10 ESSENTIAL HYPERTENSION: ICD-10-CM

## 2020-08-26 DIAGNOSIS — E78.5 DYSLIPIDEMIA: ICD-10-CM

## 2020-08-26 DIAGNOSIS — R73.01 IMPAIRED FASTING GLUCOSE: ICD-10-CM

## 2020-08-26 DIAGNOSIS — I35.0 MODERATE AORTIC STENOSIS: ICD-10-CM

## 2020-08-26 PROCEDURE — 99214 OFFICE O/P EST MOD 30 MIN: CPT | Performed by: INTERNAL MEDICINE

## 2020-08-26 RX ORDER — AMLODIPINE BESYLATE 2.5 MG/1
2.5 TABLET ORAL DAILY
COMMUNITY
End: 2021-03-29

## 2020-08-26 RX ORDER — SIMVASTATIN 10 MG
10 TABLET ORAL
Qty: 90 TAB | Refills: 3 | Status: SHIPPED | OUTPATIENT
Start: 2020-08-26

## 2020-08-26 NOTE — PROGRESS NOTES
Cardiology Follow-up Consultation Note    Date of note:   8/26/2020  Primary Care Provider: Mac Gentile M.D.  Referring Provider: Mac Gentile M.D.     Patient Name: Brennan Farooq   YOB: 1955  MRN:              [id number]    Chief Complaint: aortic stenosis.     History of Present Illness: Brennan Farooq Jr. is a 64 y.o. male whose current medical problems include hypertension, dyslipidemia, and moderate aortic stenosis who is here for follow-up.     At our initial visit,  7/17/2019:  Started walking 30 minutes 2-3 times a week.     Interim Events:  Patient denies chest pain, palpitations, dyspnea on exertion, pre-syncope, syncope, lower extremity swelling, orthopnea, PND or recent weight gain.     Hypertension well controlled at home, 120s/70s.     In terms of AS, no CHF, syncope, or angina. Walks daily while pushing his wife who is in a wheelchair due to cancer.     Has essentially stopped having red meat.       ROS  Left knee pain.     Constitution: Negative for chills, fever and night sweats.   HENT: Negative for nosebleeds.    Eyes: Negative for vision loss in left eye and vision loss in right eye.   Respiratory: Negative for hemoptysis.    Gastrointestinal: Negative for hematemesis, hematochezia and melena.   Genitourinary: Negative for hematuria.   Neurological: Negative for focal weakness, numbness and paresthesias.     All other systems reviewed and discussed using a comprehensive questionnaire and are negative.         Past Medical History:   Diagnosis Date   • Dyslipidemia    • GERD (gastroesophageal reflux disease)    • Hypertension    • Moderate aortic stenosis          History reviewed. No pertinent surgical history. no previous surgeries.       Current Outpatient Medications   Medication Sig Dispense Refill   • amLODIPine (NORVASC) 2.5 MG Tab Take 2.5 mg by mouth every day.     • simvastatin (ZOCOR) 20 MG Tab TAKE 1 TABLET BY MOUTH EVERY DAY IN THE EVENING  100 Tab 0   • Famotidine (PEPCID PO) Take  by mouth.       No current facility-administered medications for this visit.          No Known Allergies      Family History   Problem Relation Age of Onset   • Arthritis Mother    • Alcohol/Drug Mother    • Alcohol/Drug Father    • Hypertension Father    • Other Father 46        brain aneurysm rupture   • Heart Disease Neg Hx          Social History     Socioeconomic History   • Marital status:      Spouse name: Not on file   • Number of children: Not on file   • Years of education: Not on file   • Highest education level: Not on file   Occupational History   • Not on file   Social Needs   • Financial resource strain: Not on file   • Food insecurity     Worry: Not on file     Inability: Not on file   • Transportation needs     Medical: Not on file     Non-medical: Not on file   Tobacco Use   • Smoking status: Never Smoker   • Smokeless tobacco: Never Used   Substance and Sexual Activity   • Alcohol use: Yes     Alcohol/week: 0.6 oz     Types: 1 Standard drinks or equivalent per week     Comment: Rare   • Drug use: No   • Sexual activity: Yes     Comment: Businessman   Lifestyle   • Physical activity     Days per week: Not on file     Minutes per session: Not on file   • Stress: Not on file   Relationships   • Social connections     Talks on phone: Not on file     Gets together: Not on file     Attends [employer] service: Not on file     Active member of club or organization: Not on file     Attends meetings of clubs or organizations: Not on file     Relationship status: Not on file   • Intimate partner violence     Fear of current or ex partner: Not on file     Emotionally abused: Not on file     Physically abused: Not on file     Forced sexual activity: Not on file   Other Topics Concern   • Not on file   Social History Narrative    Self-employed window coverings.          Physical Exam:  Ambulatory Vitals  /64 (BP Location: Left arm, Patient Position:  "Sitting)   Pulse 64   Ht 1.791 m (5' 10.5\")   Wt 82.6 kg (182 lb)   SpO2 97%    Oxygen Therapy:  Pulse Oximetry: 97 %  BP Readings from Last 4 Encounters:   08/26/20 138/64   10/03/19 112/64   07/17/19 148/88   04/01/19 132/72       Weight/BMI: Body mass index is 25.75 kg/m².  Wt Readings from Last 4 Encounters:   08/26/20 82.6 kg (182 lb)   10/03/19 87.3 kg (192 lb 6.4 oz)   07/17/19 88.9 kg (196 lb)   04/01/19 89.4 kg (197 lb)       General: Well appearing and in no apparent distress  Eyes: nl conjunctiva  ENT: OP covered by mask.   Neck: JVP <8 cm H2O  Lungs: normal respiratory effort, CTAB  Heart: RRR, 2/6 early peaking systolic murmur at RUSB radiating to bilateral carotids, no rubs or gallops, no edema bilateral lower extremities. No LV/RV heave on cardiac palpatation. 2+ bilateral radial pulses.  2+ bilateral dp pulses.   Abdomen: soft, non tender, non distended, no masses, normal bowel sounds.  No HSM.  Extremities/MSK: no clubbing, no cyanosis  Neurological: No focal sensory deficits  Psychiatric: Appropriate affect, A/O x 3, intact judgement and insight  Skin: Warm extremities    Exam repeated in full and unchanged except for as noted above.        Lab Data Review:  Lab Results   Component Value Date/Time    CHOLSTRLTOT 158 09/30/2019 06:14 AM    LDL 88 09/30/2019 06:14 AM    HDL 49 09/30/2019 06:14 AM    TRIGLYCERIDE 105 09/30/2019 06:14 AM       Lab Results   Component Value Date/Time    SODIUM 140 09/30/2019 06:14 AM    POTASSIUM 4.4 09/30/2019 06:14 AM    CHLORIDE 108 09/30/2019 06:14 AM    CO2 25 09/30/2019 06:14 AM    GLUCOSE 105 (H) 09/30/2019 06:14 AM    BUN 22 09/30/2019 06:14 AM    CREATININE 1.09 09/30/2019 06:14 AM     Lab Results   Component Value Date/Time    ALKPHOSPHAT 43 09/30/2019 06:14 AM    ASTSGOT 25 09/30/2019 06:14 AM    ALTSGPT 20 09/30/2019 06:14 AM    TBILIRUBIN 0.5 09/30/2019 06:14 AM      No results found for: WBC  No components found for: HBGA1C  No components found for: " TROPONIN  No components found for: BNP      Cardiac Imaging and Procedures Review:    EKG dated 2019: My personal interpretation is NSR, artifact, otherwise normal     Echo dated 2019:   FINDINGS  Left Ventricle  Normal left ventricular chamber size. Mild concentric left ventricular   hypertrophy. Normal left ventricular systolic function. Left   ventricular ejection fraction is visually estimated to be 60%. Normal   regional wall motion. Normal diastolic function.    Right Ventricle  Normal right ventricular size and systolic function.    Right Atrium  Normal right atrial size. Normal inferior vena cava size and   inspiratory collapse.    Left Atrium  Normal left atrial size.    Mitral Valve  Structurally normal mitral valve. No mitral stenosis. No mitral   regurgitation.    Aortic Valve  Moderate aortic sclerosis. Tricuspid aortic valve.  Vmax is 3.25  m/s.    Transvalvular gradients are - Peak: 42 mmHg, Mean: 26 mmHg. Mild aortic   insufficiency. Pressure half time is 660   msec.    Tricuspid Valve  Structurally normal tricuspid valve. No tricuspid stenosis. Trace   tricuspid regurgitation. Unable to estimate pulmonary artery pressure   due to an inadequate tricuspid regurgitant jet.    Pulmonic Valve  The pulmonic valve is not well visualized. Trace pulmonic   insufficiency.    Pericardium  Normal pericardium without effusion. Fat pad present.    Aorta  The aortic root is normal.  Ascending aorta is dilated with a diameter   of  4.1 cm.    Radiology test review   2019 CCS:  Coronary calcification:  LMA - 0.0  LCX - 0.0  LAD - 35.5  RCA - 0.0  PDA - 0.0     Total Calcium Score: 35.5      Medical Decision Makin. Moderate aortic stenosis  Currently no symptoms. Discussed ED/clinic precautions.   -monitor with echocardiogram Q1 year. Ordered today.     2. Essential hypertension  Well controlled.   -continue norvasc 2.5mg PO daily.   -increase norvasc to 5mg PO daily if not at goal. Would add  losartan as second agent if necessary. Check BP at home 1-2 times a week.     3. Dyslipidemia  Possible GI side effects on simvastatin, will decrease dose to 10mg PO daily. If still GI side effects, will switch to lipitor 10mg PO daily.     Return in about 1 year (around 8/26/2021).      Ric Figueredo MD, [facility], [address]  [address] [address]  Phone: [phone]  Fax: [phone]

## 2020-09-16 ENCOUNTER — HOSPITAL ENCOUNTER (OUTPATIENT)
Dept: CARDIOLOGY | Facility: MEDICAL CENTER | Age: 65
End: 2020-09-16
Attending: INTERNAL MEDICINE
Payer: MEDICARE

## 2020-09-16 DIAGNOSIS — I35.0 MODERATE AORTIC STENOSIS: ICD-10-CM

## 2020-09-16 LAB
LV EJECT FRACT  99904: 65
LV EJECT FRACT MOD 2C 99903: 61.65
LV EJECT FRACT MOD 4C 99902: 58.06
LV EJECT FRACT MOD BP 99901: 58.13

## 2020-09-16 PROCEDURE — 93306 TTE W/DOPPLER COMPLETE: CPT

## 2020-09-16 PROCEDURE — 93306 TTE W/DOPPLER COMPLETE: CPT | Mod: 26 | Performed by: INTERNAL MEDICINE

## 2020-09-17 ENCOUNTER — APPOINTMENT (OUTPATIENT)
Dept: CARDIOLOGY | Facility: MEDICAL CENTER | Age: 65
End: 2020-09-17
Attending: INTERNAL MEDICINE
Payer: MEDICARE

## 2020-10-01 ENCOUNTER — PATIENT OUTREACH (OUTPATIENT)
Dept: HEALTH INFORMATION MANAGEMENT | Facility: OTHER | Age: 65
End: 2020-10-01

## 2020-10-01 NOTE — PROGRESS NOTES
Wished ROME Alcantar. Currently has many appointments with his wife due to her condition. Mailed my contact information and pt will reach out when he will be able to schedule.

## 2020-12-03 ENCOUNTER — HOSPITAL ENCOUNTER (OUTPATIENT)
Facility: MEDICAL CENTER | Age: 65
End: 2020-12-03
Payer: MEDICARE

## 2020-12-03 PROCEDURE — 82274 ASSAY TEST FOR BLOOD FECAL: CPT

## 2020-12-10 LAB — HEMOCCULT STL QL IA: NEGATIVE

## 2021-01-28 ENCOUNTER — OFFICE VISIT (OUTPATIENT)
Dept: MEDICAL GROUP | Facility: MEDICAL CENTER | Age: 66
End: 2021-01-28
Payer: MEDICARE

## 2021-01-28 VITALS
DIASTOLIC BLOOD PRESSURE: 78 MMHG | RESPIRATION RATE: 18 BRPM | BODY MASS INDEX: 24.84 KG/M2 | HEIGHT: 71 IN | SYSTOLIC BLOOD PRESSURE: 136 MMHG | TEMPERATURE: 98.7 F | WEIGHT: 177.4 LBS | OXYGEN SATURATION: 97 % | HEART RATE: 83 BPM

## 2021-01-28 DIAGNOSIS — R10.9 ABDOMINAL PAIN, UNSPECIFIED ABDOMINAL LOCATION: ICD-10-CM

## 2021-01-28 PROCEDURE — 99214 OFFICE O/P EST MOD 30 MIN: CPT | Performed by: FAMILY MEDICINE

## 2021-01-28 RX ORDER — OMEPRAZOLE 20 MG/1
20 CAPSULE, DELAYED RELEASE ORAL DAILY
Qty: 14 CAP | Refills: 0 | Status: SHIPPED | OUTPATIENT
Start: 2021-01-28 | End: 2021-02-11

## 2021-01-28 ASSESSMENT — PATIENT HEALTH QUESTIONNAIRE - PHQ9: CLINICAL INTERPRETATION OF PHQ2 SCORE: 0

## 2021-01-28 NOTE — ASSESSMENT & PLAN NOTE
She presents today with a new problem.  For the past 6 weeks he has gotten intermittent tingling sensations in the left upper quadrant.  These occur about 2 or 3 times per day and last for about 30 seconds each time.  He denies associated chest pain, shortness of breath, nausea, vomiting, diarrhea.  He has not really tried anything to help be on Pepcid, however, the Pepcid does seem to help.  He has a known history of reflux.

## 2021-01-28 NOTE — PROGRESS NOTES
"[facility]  Progress Note  Established Patient    Subjective:   Brennan Farooq Jr. is a 65 y.o. male here today with a chief complaint of abdominal pain. The patient is alone, both of us are masked.     Abdominal pain  She presents today with a new problem.  For the past 6 weeks he has gotten intermittent tingling sensations in the left upper quadrant.  These occur about 2 or 3 times per day and last for about 30 seconds each time.  He denies associated chest pain, shortness of breath, nausea, vomiting, diarrhea.  He has not really tried anything to help be on Pepcid, however, the Pepcid does seem to help.  He has a known history of reflux.      Current Outpatient Medications on File Prior to Visit   Medication Sig Dispense Refill   • amLODIPine (NORVASC) 2.5 MG Tab Take 2.5 mg by mouth every day.     • simvastatin (ZOCOR) 10 MG Tab Take 1 Tab by mouth every day. N THE EVENING 90 Tab 3   • Famotidine (PEPCID PO) Take  by mouth.       No current facility-administered medications on file prior to visit.           Objective:     Vitals:    01/28/21 1504   BP: 136/78   BP Location: Left arm   Patient Position: Sitting   BP Cuff Size: Adult long   Pulse: 83   Resp: 18   Temp: 37.1 °C (98.7 °F)   TempSrc: Temporal   SpO2: 97%   Weight: 80.5 kg (177 lb 6.4 oz)   Height: 1.791 m (5' 10.5\")       Physical Exam:  General: alert in no apparent distress.   Abd: Soft, nontender, nondistended.  Negative Wiseman sign.  No rebound, no guarding.        Assessment and Plan:     1. Abdominal pain, unspecified abdominal location  Uncertain etiology.  This is an undiagnosed new problem with an uncertain prognosis.  We will presumptively treat as reflux and I will follow up with the patient via MyChart in 1 week.  If no significant improvement, will pursue labs and imaging.  At that point, we will also order the patient's standard screening labs for his wellness visit in 3 weeks.  - omeprazole (PRILOSEC) 20 MG " delayed-release capsule; Take 1 Cap by mouth every day for 14 days.  Dispense: 14 Cap; Refill: 0        Followup: Return in about 3 weeks (around 2/18/2021), or if symptoms worsen or fail to improve, for Wellness Visit, Long.

## 2021-02-17 ENCOUNTER — OFFICE VISIT (OUTPATIENT)
Dept: MEDICAL GROUP | Facility: MEDICAL CENTER | Age: 66
End: 2021-02-17
Payer: MEDICARE

## 2021-02-17 VITALS
WEIGHT: 179 LBS | HEART RATE: 77 BPM | HEIGHT: 70 IN | OXYGEN SATURATION: 97 % | TEMPERATURE: 97.9 F | BODY MASS INDEX: 25.62 KG/M2 | DIASTOLIC BLOOD PRESSURE: 64 MMHG | RESPIRATION RATE: 16 BRPM | SYSTOLIC BLOOD PRESSURE: 132 MMHG

## 2021-02-17 DIAGNOSIS — Z00.00 HEALTHCARE MAINTENANCE: ICD-10-CM

## 2021-02-17 DIAGNOSIS — R10.9 ABDOMINAL PAIN, UNSPECIFIED ABDOMINAL LOCATION: ICD-10-CM

## 2021-02-17 DIAGNOSIS — Z12.5 SCREENING FOR PROSTATE CANCER: ICD-10-CM

## 2021-02-17 DIAGNOSIS — Z23 NEED FOR VACCINATION: ICD-10-CM

## 2021-02-17 DIAGNOSIS — Z13.1 SCREENING FOR DIABETES MELLITUS: ICD-10-CM

## 2021-02-17 PROCEDURE — G0439 PPPS, SUBSEQ VISIT: HCPCS | Performed by: FAMILY MEDICINE

## 2021-02-17 PROCEDURE — 90732 PPSV23 VACC 2 YRS+ SUBQ/IM: CPT | Performed by: FAMILY MEDICINE

## 2021-02-17 PROCEDURE — G0009 ADMIN PNEUMOCOCCAL VACCINE: HCPCS | Performed by: FAMILY MEDICINE

## 2021-02-17 ASSESSMENT — ACTIVITIES OF DAILY LIVING (ADL): BATHING_REQUIRES_ASSISTANCE: 0

## 2021-02-17 ASSESSMENT — PATIENT HEALTH QUESTIONNAIRE - PHQ9
SUM OF ALL RESPONSES TO PHQ QUESTIONS 1-9: 0
CLINICAL INTERPRETATION OF PHQ2 SCORE: 0

## 2021-02-17 ASSESSMENT — ENCOUNTER SYMPTOMS: GENERAL WELL-BEING: GOOD

## 2021-02-17 NOTE — PROGRESS NOTES
Chief Complaint   Patient presents with   • Annual Exam   • Abdominal Pain         HPI:  Brennan is a 65 y.o. here for Medicare Annual Wellness Visit    Abdominal pain  This has improved.    Healthcare maintenance  Lipids: Ordered.  Fasting Glucose: Ordered.  Hepatitis C Screen: Done 2018 and negative  Colonoscopy: Declined.  Fit test ordered.  PSA: Ordered.    Pneumonia vaccine: Prevnar 13 given 11/05/18.  Pneumovax given 02/17/21.   Tdap: given 11/05/18.       Patient Active Problem List    Diagnosis Date Noted   • Abdominal pain 01/28/2021   • Dysfunction of both eustachian tubes 12/18/2018   • Moderate aortic stenosis 12/18/2018   • Impaired fasting glucose 12/18/2018   • Dyslipidemia 11/08/2018   • Gastroesophageal reflux disease without esophagitis 11/05/2018   • Healthcare maintenance 11/05/2018   • Dupuytren's contracture 11/05/2018   • Bilateral hand pain 11/05/2018   • Essential hypertension 11/05/2018       Current Outpatient Medications   Medication Sig Dispense Refill   • amLODIPine (NORVASC) 2.5 MG Tab Take 2.5 mg by mouth every day.     • simvastatin (ZOCOR) 10 MG Tab Take 1 Tab by mouth every day. N THE EVENING 90 Tab 3   • Famotidine (PEPCID PO) Take  by mouth.       No current facility-administered medications for this visit.        Patient is taking medications as noted in medication list.  Current supplements as per medication list.     Allergies: Patient has no known allergies.    Current social contact/activities: Pt. Stays in touch with family and friends. Pt. Visits with family     Is patient current with immunizations? No, due for FLU and PNEUMOVAX (PPSV23). Patient is interested in receiving PNEUMOVAX (PPSV23) today.    He  reports that he has never smoked. He has never used smokeless tobacco. He reports current alcohol use of about 0.6 oz of alcohol per week. He reports that he does not use drugs.  Counseling given: Not Answered        DPA/Advanced directive: Patient does not have an Advanced  Directive.  A packet and workshop information was given on Advanced Directives.    ROS:    Gait: Uses no assistive device   Ostomy: No   Other tubes: No   Amputations: No   Chronic oxygen use No   Last eye exam > 1 year   Wears hearing aids: No   : Denies any urinary leakage during the last 6 months      Depression Screening    Little interest or pleasure in doing things?  0 - not at all  Feeling down, depressed, or hopeless? 0 - not at all  Patient Health Questionnaire Score: 0    If depressive symptoms identified deferred to follow up visit unless specifically addressed in assessment and plan.    Interpretation of PHQ-9 Total Score   Score Severity   1-4 No Depression   5-9 Mild Depression   10-14 Moderate Depression   15-19 Moderately Severe Depression   20-27 Severe Depression    Screening for Cognitive Impairment    Three Minute Recall (river, nation, finger)  3/3    Chuck clock face with all 12 numbers and set the hands to show 10 past 11.  Yes 5/5  If cognitive concerns identified, deferred for follow up unless specifically addressed in assessment and plan.    Fall Risk Assessment    Has the patient had two or more falls in the last year or any fall with injury in the last year?  No  If fall risk identified, deferred for follow up unless specifically addressed in assessment and plan.    Safety Assessment    Throw rugs on floor.  No  Handrails on all stairs.  Yes  Good lighting in all hallways.  Yes  Difficulty hearing.  No  Patient counseled about all safety risks that were identified.    Functional Assessment ADLs    Are there any barriers preventing you from cooking for yourself or meeting nutritional needs?  No.    Are there any barriers preventing you from driving safely or obtaining transportation?  No.    Are there any barriers preventing you from using a telephone or calling for help?  No.    Are there any barriers preventing you from shopping?  No.    Are there any barriers preventing you from taking  "care of your own finances?  No.    Are there any barriers preventing you from managing your medications?  No.    Are there any barriers preventing you from showering, bathing or dressing yourself?  No.    Are you currently engaging in any exercise or physical activity?  Yes.  Pt. Walks 3-4 times a week.  Pt. Walks at work.  What is your perception of your health?  Good.    Health Maintenance Summary                IMM ZOSTER VACCINES Overdue 10/1/2003     IMM INFLUENZA Overdue 9/1/2020     COLON CANCER SCREENING ANNUAL FIT Next Due 12/3/2021      Done 12/3/2020 OCCULT BLOOD FECES IMMUNOASSAY     Patient has more history with this topic...    IMM DTaP/Tdap/Td Vaccine Next Due 11/5/2028      Done 11/5/2018 Imm Admin: Tdap Vaccine          Patient Care Team:  Mac Gentile M.D. as PCP - General (Family Medicine)  Lenin Harris Ass't as [facility]     Social History     Tobacco Use   • Smoking status: Never Smoker   • Smokeless tobacco: Never Used   Substance Use Topics   • Alcohol use: Yes     Alcohol/week: 0.6 oz     Types: 1 Standard drinks or equivalent per week     Comment: Rare   • Drug use: No     Family History   Problem Relation Age of Onset   • Arthritis Mother    • Alcohol/Drug Mother    • Alcohol/Drug Father    • Hypertension Father    • Other Father 46        brain aneurysm rupture   • Heart Disease Neg Hx      He  has a past medical history of Dyslipidemia, GERD (gastroesophageal reflux disease), Hypertension, and Moderate aortic stenosis.   History reviewed. No pertinent surgical history.        Exam:     /64 (BP Location: Left arm, Patient Position: Sitting, BP Cuff Size: Adult long)   Pulse 77   Temp 36.6 °C (97.9 °F) (Temporal)   Resp 16   Ht 1.772 m (5' 9.75\")   Wt 81.2 kg (179 lb)   SpO2 97%  Body mass index is 25.87 kg/m².    Hearing good.    Dentition good  Alert, oriented in no acute distress.  Eye contact is good, speech goal directed, affect " calm  Cardio: 2/6 EVE.   Resp: CTAB.       Assessment and Plan. The following treatment and monitoring plan is recommended:      1. Abdominal pain, unspecified abdominal location  - improved.     2. Healthcare maintenance  - Comp Metabolic Panel; Future  - PROSTATE SPECIFIC AG SCREENING; Future  - Subsequent Annual Wellness Visit - Includes PPPS ()    3. Need for vaccination  - Pneumoccocal Polysaccharide Vaccine 23-Valent =>3yo SQ/IM    4. Screening for prostate cancer  - PROSTATE SPECIFIC AG SCREENING; Future    5. Screening for diabetes mellitus  - Comp Metabolic Panel; Future      Services suggested: No services needed at this time  Health Care Screening recommendations as per orders if indicated.  Referrals offered: PT/OT/Nutrition counseling/Behavioral Health/Smoking cessation as per orders if indicated.    Discussion today about general wellness and lifestyle habits:    · Prevent falls and reduce trip hazards; Cautioned about securing or removing rugs.  · Have a working fire alarm and carbon monoxide detector;   · Engage in regular physical activity and social activities       Follow-up: Return in about 1 year (around 2/17/2022), or if symptoms worsen or fail to improve, for Wellness Visit, Long.

## 2021-02-17 NOTE — ASSESSMENT & PLAN NOTE
Lipids: Ordered.  Fasting Glucose: Ordered.  Hepatitis C Screen: Done 2018 and negative  Colonoscopy: Declined.  Fit test ordered.  PSA: Ordered.    Pneumonia vaccine: Prevnar 13 given 11/05/18.  Pneumovax given 02/17/21.   Tdap: given 11/05/18.

## 2021-02-22 ENCOUNTER — HOSPITAL ENCOUNTER (OUTPATIENT)
Dept: LAB | Facility: MEDICAL CENTER | Age: 66
End: 2021-02-22
Attending: FAMILY MEDICINE
Payer: MEDICARE

## 2021-02-22 DIAGNOSIS — Z12.5 SCREENING FOR PROSTATE CANCER: ICD-10-CM

## 2021-02-22 DIAGNOSIS — Z13.1 SCREENING FOR DIABETES MELLITUS: ICD-10-CM

## 2021-02-22 DIAGNOSIS — Z00.00 HEALTHCARE MAINTENANCE: ICD-10-CM

## 2021-02-22 LAB
ALBUMIN SERPL BCP-MCNC: 4.7 G/DL (ref 3.2–4.9)
ALBUMIN/GLOB SERPL: 1.7 G/DL
ALP SERPL-CCNC: 51 U/L (ref 30–99)
ALT SERPL-CCNC: 12 U/L (ref 2–50)
ANION GAP SERPL CALC-SCNC: 9 MMOL/L (ref 7–16)
AST SERPL-CCNC: 19 U/L (ref 12–45)
BILIRUB SERPL-MCNC: 0.8 MG/DL (ref 0.1–1.5)
BUN SERPL-MCNC: 15 MG/DL (ref 8–22)
CALCIUM SERPL-MCNC: 9.6 MG/DL (ref 8.5–10.5)
CHLORIDE SERPL-SCNC: 102 MMOL/L (ref 96–112)
CO2 SERPL-SCNC: 28 MMOL/L (ref 20–33)
CREAT SERPL-MCNC: 1.01 MG/DL (ref 0.5–1.4)
GLOBULIN SER CALC-MCNC: 2.7 G/DL (ref 1.9–3.5)
GLUCOSE SERPL-MCNC: 107 MG/DL (ref 65–99)
POTASSIUM SERPL-SCNC: 4.8 MMOL/L (ref 3.6–5.5)
PROT SERPL-MCNC: 7.4 G/DL (ref 6–8.2)
PSA SERPL-MCNC: 0.15 NG/ML (ref 0–4)
SODIUM SERPL-SCNC: 139 MMOL/L (ref 135–145)

## 2021-02-22 PROCEDURE — 36415 COLL VENOUS BLD VENIPUNCTURE: CPT

## 2021-02-22 PROCEDURE — 84153 ASSAY OF PSA TOTAL: CPT

## 2021-02-22 PROCEDURE — 80053 COMPREHEN METABOLIC PANEL: CPT

## 2021-03-03 DIAGNOSIS — Z23 NEED FOR VACCINATION: ICD-10-CM

## 2021-03-10 ENCOUNTER — PATIENT MESSAGE (OUTPATIENT)
Dept: MEDICAL GROUP | Facility: MEDICAL CENTER | Age: 66
End: 2021-03-10

## 2021-03-10 DIAGNOSIS — R10.12 LEFT UPPER QUADRANT ABDOMINAL PAIN: ICD-10-CM

## 2021-03-11 ENCOUNTER — HOSPITAL ENCOUNTER (OUTPATIENT)
Dept: RADIOLOGY | Facility: MEDICAL CENTER | Age: 66
End: 2021-03-11
Attending: FAMILY MEDICINE
Payer: MEDICARE

## 2021-03-11 DIAGNOSIS — R10.12 LEFT UPPER QUADRANT ABDOMINAL PAIN: ICD-10-CM

## 2021-03-11 PROCEDURE — 74177 CT ABD & PELVIS W/CONTRAST: CPT | Mod: MG

## 2021-03-11 PROCEDURE — 700117 HCHG RX CONTRAST REV CODE 255: Performed by: FAMILY MEDICINE

## 2021-03-11 RX ADMIN — IOHEXOL 25 ML: 240 INJECTION, SOLUTION INTRATHECAL; INTRAVASCULAR; INTRAVENOUS; ORAL at 07:20

## 2021-03-11 RX ADMIN — IOHEXOL 100 ML: 350 INJECTION, SOLUTION INTRAVENOUS at 08:25

## 2021-03-31 ENCOUNTER — TELEMEDICINE (OUTPATIENT)
Dept: MEDICAL GROUP | Facility: MEDICAL CENTER | Age: 66
End: 2021-03-31
Payer: MEDICARE

## 2021-03-31 VITALS — HEIGHT: 70 IN | BODY MASS INDEX: 25.48 KG/M2 | HEART RATE: 64 BPM | WEIGHT: 178 LBS

## 2021-03-31 DIAGNOSIS — L57.0 ACTINIC KERATOSES: ICD-10-CM

## 2021-03-31 DIAGNOSIS — R73.01 IMPAIRED FASTING GLUCOSE: ICD-10-CM

## 2021-03-31 DIAGNOSIS — R10.9 ABDOMINAL PAIN, UNSPECIFIED ABDOMINAL LOCATION: ICD-10-CM

## 2021-03-31 PROCEDURE — 99214 OFFICE O/P EST MOD 30 MIN: CPT | Mod: 95,CR | Performed by: FAMILY MEDICINE

## 2021-03-31 RX ORDER — FLUOROURACIL 50 MG/G
CREAM TOPICAL
Qty: 40 G | Refills: 0 | Status: SHIPPED | OUTPATIENT
Start: 2021-03-31 | End: 2021-04-22

## 2021-03-31 NOTE — PROGRESS NOTES
Telemedicine Visit: Established Patient     This encounter was conducted via Zoom.   Verbal consent was obtained. Patient's identity was verified.    Subjective:     Brennan Farooq Jr. is a 65 y.o. male presenting for evaluation and management of AK.    Impaired fasting glucose  Noted on labs.    Abdominal pain  Significantly improved.  CT scan of the abdomen reassuring.    Actinic keratoses  Patient describes many months of rough, red skin lesions on his face, the most prominent of which is on the forehead.  He has been using CeraVe but this does not seem to be helping.      No Known Allergies    Current medicines (including changes today)  Current Outpatient Medications   Medication Sig Dispense Refill   • fluorouracil (EFUDEX) 5 % cream Apply a thin layer twice daily x 3 weeks 40 g 0   • amLODIPine (NORVASC) 2.5 MG Tab TAKE 1 TABLET BY MOUTH EVERY  tablet 4   • simvastatin (ZOCOR) 10 MG Tab Take 1 Tab by mouth every day. N THE EVENING 90 Tab 3   • Famotidine (PEPCID PO) Take  by mouth.       No current facility-administered medications for this visit.       Patient Active Problem List    Diagnosis Date Noted   • Actinic keratoses 03/31/2021   • Dysfunction of both eustachian tubes 12/18/2018   • Moderate aortic stenosis 12/18/2018   • Impaired fasting glucose 12/18/2018   • Dyslipidemia 11/08/2018   • Gastroesophageal reflux disease without esophagitis 11/05/2018   • Healthcare maintenance 11/05/2018   • Dupuytren's contracture 11/05/2018   • Bilateral hand pain 11/05/2018   • Essential hypertension 11/05/2018       Family History   Problem Relation Age of Onset   • Arthritis Mother    • Alcohol/Drug Mother    • Alcohol/Drug Father    • Hypertension Father    • Other Father 46        brain aneurysm rupture   • Heart Disease Neg Hx        He  has a past medical history of Dyslipidemia, GERD (gastroesophageal reflux disease), Hypertension, and Moderate aortic stenosis.  He  has no past surgical history  "on file.       Objective:   Vitals obtained by patient:  Pulse 64   Ht 1.778 m (5' 10\") Comment: pt reported  Wt 80.7 kg (178 lb) Comment: pt reported  BMI 25.54 kg/m²       Physical Exam:  Constitutional: Alert, no distress, well-groomed.  Skin: Rough red skin lesion noted on the forehead, as well as several of them on the face.  Psych: normal affect.      Assessment and Plan:     1. Actinic keratoses  Patient skin lesions are most consistent with actinic keratoses.  I recommended 5-fluorouracil and I will see him back in 6 weeks for reevaluation.  With any residual skin lesions, biopsy will be obtained.  - fluorouracil (EFUDEX) 5 % cream; Apply a thin layer twice daily x 3 weeks  Dispense: 40 g; Refill: 0    2. Abdominal pain, unspecified abdominal location  -Improved.    3. Impaired fasting glucose  -Monitor.          Follow-up: Return in about 6 weeks (around 5/12/2021), or if symptoms worsen or fail to improve.           "

## 2021-03-31 NOTE — ASSESSMENT & PLAN NOTE
Patient describes many months of rough, red skin lesions on his face, the most prominent of which is on the forehead.  He has been using CeraVe but this does not seem to be helping.

## 2021-04-22 ENCOUNTER — TELEMEDICINE (OUTPATIENT)
Dept: MEDICAL GROUP | Facility: MEDICAL CENTER | Age: 66
End: 2021-04-22
Payer: MEDICARE

## 2021-04-22 VITALS
WEIGHT: 178 LBS | BODY MASS INDEX: 24.92 KG/M2 | HEIGHT: 71 IN | SYSTOLIC BLOOD PRESSURE: 122 MMHG | DIASTOLIC BLOOD PRESSURE: 65 MMHG | HEART RATE: 73 BPM

## 2021-04-22 DIAGNOSIS — R10.9 ABDOMINAL DISCOMFORT: ICD-10-CM

## 2021-04-22 DIAGNOSIS — L57.0 ACTINIC KERATOSES: ICD-10-CM

## 2021-04-22 PROCEDURE — 99213 OFFICE O/P EST LOW 20 MIN: CPT | Mod: 95 | Performed by: FAMILY MEDICINE

## 2021-04-22 NOTE — ASSESSMENT & PLAN NOTE
The patient has been using 5-fluorouracil for the past 3 weeks.  It has really turned his face red and he has 1 more day left of the treatment.

## 2021-04-22 NOTE — ASSESSMENT & PLAN NOTE
Patient has a history of abdominal discomfort.  I obtained a CT scan which did not show any acute pathology.  The discomfort resolved but recently he developed some generalized abdominal discomfort.  For the past for 5 days, this has also resolved.  He thinks it may be related to the 5-fluorouracil.

## 2021-04-22 NOTE — PROGRESS NOTES
Telemedicine Visit: Established Patient     This encounter was conducted via Zoom.   Verbal consent was obtained. Patient's identity was verified.    Subjective:     Brennan Farooq Jr. is a 65 y.o. male presenting for evaluation and management of AK's.    Actinic keratoses  The patient has been using 5-fluorouracil for the past 3 weeks.  It has really turned his face red and he has 1 more day left of the treatment.    Abdominal discomfort  Patient has a history of abdominal discomfort.  I obtained a CT scan which did not show any acute pathology.  The discomfort resolved but recently he developed some generalized abdominal discomfort.  For the past for 5 days, this has also resolved.  He thinks it may be related to the 5-fluorouracil.      No Known Allergies    Current medicines (including changes today)  Current Outpatient Medications   Medication Sig Dispense Refill   • amLODIPine (NORVASC) 2.5 MG Tab TAKE 1 TABLET BY MOUTH EVERY  tablet 4   • simvastatin (ZOCOR) 10 MG Tab Take 1 Tab by mouth every day. N THE EVENING 90 Tab 3   • Famotidine (PEPCID PO) Take  by mouth.       No current facility-administered medications for this visit.       Patient Active Problem List    Diagnosis Date Noted   • Actinic keratoses 03/31/2021   • Abdominal discomfort 01/28/2021   • Dysfunction of both eustachian tubes 12/18/2018   • Moderate aortic stenosis 12/18/2018   • Impaired fasting glucose 12/18/2018   • Dyslipidemia 11/08/2018   • Gastroesophageal reflux disease without esophagitis 11/05/2018   • Healthcare maintenance 11/05/2018   • Dupuytren's contracture 11/05/2018   • Bilateral hand pain 11/05/2018   • Essential hypertension 11/05/2018       Family History   Problem Relation Age of Onset   • Arthritis Mother    • Alcohol/Drug Mother    • Alcohol/Drug Father    • Hypertension Father    • Other Father 46        brain aneurysm rupture   • Heart Disease Neg Hx        He  has a past medical history of Dyslipidemia,  "GERD (gastroesophageal reflux disease), Hypertension, and Moderate aortic stenosis.  He  has no past surgical history on file.       Objective:   Vitals obtained by patient:  /65 (BP Location: Left arm, Patient Position: Sitting, BP Cuff Size: Adult) Comment: pt reported  Pulse 73 Comment: pt reported  Ht 1.791 m (5' 10.5\") Comment: pt reported  Wt 80.7 kg (178 lb) Comment: pt reported  BMI 25.18 kg/m²       Physical Exam:  Constitutional: Alert, no distress, well-groomed.  Skin: Generalized facial erythema on areas of 5-fluorouracil application.      Assessment and Plan:     1. Actinic keratoses  The 5-fluorouracil seems to have done its job.  I informed the patient that he may stop the cream and I will see him in follow-up in person in 3 weeks for reevaluation.  Cryotherapy can be performed in any residual lesions.    2. Abdominal discomfort  Improved, will continue to monitor.          Follow-up: Return if symptoms worsen or fail to improve.         "

## 2021-05-05 ENCOUNTER — APPOINTMENT (OUTPATIENT)
Dept: RADIOLOGY | Facility: IMAGING CENTER | Age: 66
End: 2021-05-05
Attending: PHYSICIAN ASSISTANT
Payer: MEDICARE

## 2021-05-05 ENCOUNTER — OFFICE VISIT (OUTPATIENT)
Dept: URGENT CARE | Facility: CLINIC | Age: 66
End: 2021-05-05
Payer: MEDICARE

## 2021-05-05 ENCOUNTER — NURSE TRIAGE (OUTPATIENT)
Dept: HEALTH INFORMATION MANAGEMENT | Facility: OTHER | Age: 66
End: 2021-05-05

## 2021-05-05 VITALS
TEMPERATURE: 98.5 F | SYSTOLIC BLOOD PRESSURE: 142 MMHG | RESPIRATION RATE: 18 BRPM | HEART RATE: 83 BPM | WEIGHT: 175 LBS | DIASTOLIC BLOOD PRESSURE: 90 MMHG | OXYGEN SATURATION: 97 % | HEIGHT: 69 IN | BODY MASS INDEX: 25.92 KG/M2

## 2021-05-05 DIAGNOSIS — R10.84 GENERALIZED ABDOMINAL PAIN: ICD-10-CM

## 2021-05-05 DIAGNOSIS — R10.84 GENERALIZED ABDOMINAL PAIN: Primary | ICD-10-CM

## 2021-05-05 LAB
APPEARANCE UR: CLEAR
BILIRUB UR STRIP-MCNC: NORMAL MG/DL
COLOR UR AUTO: YELLOW
GLUCOSE UR STRIP.AUTO-MCNC: NORMAL MG/DL
KETONES UR STRIP.AUTO-MCNC: NORMAL MG/DL
LEUKOCYTE ESTERASE UR QL STRIP.AUTO: NORMAL
NITRITE UR QL STRIP.AUTO: NORMAL
PH UR STRIP.AUTO: 5.5 [PH] (ref 5–8)
PROT UR QL STRIP: NORMAL MG/DL
RBC UR QL AUTO: NORMAL
SP GR UR STRIP.AUTO: 1.01
UROBILINOGEN UR STRIP-MCNC: 0.2 MG/DL

## 2021-05-05 PROCEDURE — 81002 URINALYSIS NONAUTO W/O SCOPE: CPT | Performed by: PHYSICIAN ASSISTANT

## 2021-05-05 PROCEDURE — 74018 RADEX ABDOMEN 1 VIEW: CPT | Mod: TC,FY | Performed by: PHYSICIAN ASSISTANT

## 2021-05-05 PROCEDURE — 99214 OFFICE O/P EST MOD 30 MIN: CPT | Performed by: PHYSICIAN ASSISTANT

## 2021-05-05 NOTE — TELEPHONE ENCOUNTER
1. Caller Name: Brennan Farooq               Call Back Number: [phone]  [facility] PCP or Specialty Provider: Palak Gentile        2. Has the patient previously tested positive for COVID-19? No    3.  In the last two weeks, has the patient had any new or worsening symptoms (not explained by alternative diagnosis)? No.    4.  Does patient have any comoribidities? None     5.  Has the patient had any known contact with someone who is suspected or confirmed to have COVID-19? No.    5. Disposition: Offered patient virtual visit to limit potential exposure to others; patient also given self care instructions    Note routed to [facility] Provider: CHERELLE only.       Reason for Disposition  • Abdominal pain is a chronic symptom (recurrent or ongoing AND lasting > 4 weeks)    Additional Information  • Negative: Passed out (i.e., fainted, collapsed and was not responding)  • Negative: Shock suspected (e.g., cold/pale/clammy skin, too weak to stand, low BP, rapid pulse)  • Negative: Sounds like a life-threatening emergency to the triager  • Negative: Chest pain  • Negative: Pain is mainly in upper abdomen (if needed ask: 'is it mainly above the belly button?')  • Negative: SEVERE abdominal pain (e.g., excruciating)  • Negative: Vomiting red blood or black (coffee ground) material  • Negative: Bloody, black, or tarry bowel movements  • Negative: Unable to urinate (or only a few drops) and bladder feels very full  • Negative: Pain in scrotum persists > 1 hour  • Negative: Constant abdominal pain lasting > 2 hours  • Negative: Vomiting bile (green color)  • Negative: Patient sounds very sick or weak to the triager  • Negative: Vomiting and abdomen looks much more swollen than usual  • Negative: White of the eyes have turned yellow (i.e., jaundice)  • Negative: Blood in urine (red, pink, or tea-colored)  • Negative: Fever > 103 F (39.4 C)  • Negative: Fever > 101 F (38.3 C) and over 60 years of age  • Negative: Fever > 100.0 F (37.8  "C) and has diabetes mellitus or a weak immune system (e.g., HIV positive, cancer chemotherapy, organ transplant, splenectomy, chronic steroids)  • Negative: Fever > 100.0 F (37.8 C) and bedridden (e.g., nursing home patient, stroke, chronic illness, recovering from surgery)  • Negative: MILD pain that comes and goes (cramps) lasts > 24 hours  • Negative: Age > 60 years  • Negative: Patient wants to be seen    Answer Assessment - Initial Assessment Questions  1. LOCATION: \"Where does it hurt?\"       Pain in stomach area  2. RADIATION: \"Does the pain shoot anywhere else?\" (e.g., chest, back)      none  3. ONSET: \"When did the pain begin?\" (Minutes, hours or days ago)       Comes and goes  4. SUDDEN: \"Gradual or sudden onset?\"      suddenly  5. PATTERN \"Does the pain come and go, or is it constant?\"     - If constant: \"Is it getting better, staying the same, or worsening?\"       (Note: Constant means the pain never goes away completely; most serious pain is constant and it progresses)      - If intermittent: \"How long does it last?\" \"Do you have pain now?\"      (Note: Intermittent means the pain goes away completely between bouts)      Comes and goes  6. SEVERITY: \"How bad is the pain?\"  (e.g., Scale 1-10; mild, moderate, or severe)     - MILD (1-3): doesn't interfere with normal activities, abdomen soft and not tender to touch      - MODERATE (4-7): interferes with normal activities or awakens from sleep, tender to touch      - SEVERE (8-10): excruciating pain, doubled over, unable to do any normal activities        Moderate to severe  7. RECURRENT SYMPTOM: \"Have you ever had this type of abdominal pain before?\" If so, ask: \"When was the last time?\" and \"What happened that time?\"       yes  8. CAUSE: \"What do you think is causing the abdominal pain?\"      New diagnosis of diverticulous  9. RELIEVING/AGGRAVATING FACTORS: \"What makes it better or worse?\" (e.g., movement, antacids, bowel movement)      unsure  10. OTHER " "SYMPTOMS: \"Has there been any vomiting, diarrhea, constipation, or urine problems?\"        Chills cold sweat    Protocols used: ABDOMINAL PAIN - MALE-A-OH      "

## 2021-05-05 NOTE — TELEPHONE ENCOUNTER
With these symptoms, patient needs to be seen. Please call him and advise him to go to urgent care.

## 2021-05-05 NOTE — PATIENT INSTRUCTIONS
1. Mag citrate  2. Senna stool softener      Miralax for maintenance         Constipation, Adult  Constipation is when a person has fewer bowel movements in a week than normal, has difficulty having a bowel movement, or has stools that are dry, hard, or larger than normal. Constipation may be caused by an underlying condition. It may become worse with age if a person takes certain medicines and does not take in enough fluids.  Follow these instructions at home:  Eating and drinking    · Eat foods that have a lot of fiber, such as fresh fruits and vegetables, whole grains, and beans.  · Limit foods that are high in fat, low in fiber, or overly processed, such as french fries, hamburgers, cookies, candies, and soda.  · Drink enough fluid to keep your urine clear or pale yellow.  General instructions  · Exercise regularly or as told by your health care provider.  · Go to the restroom when you have the urge to go. Do not hold it in.  · Take over-the-counter and prescription medicines only as told by your health care provider. These include any fiber supplements.  · Practice pelvic floor retraining exercises, such as deep breathing while relaxing the lower abdomen and pelvic floor relaxation during bowel movements.  · Watch your condition for any changes.  · Keep all follow-up visits as told by your health care provider. This is important.  Contact a health care provider if:  · You have pain that gets worse.  · You have a fever.  · You do not have a bowel movement after 4 days.  · You vomit.  · You are not hungry.  · You lose weight.  · You are bleeding from the anus.  · You have thin, pencil-like stools.  Get help right away if:  · You have a fever and your symptoms suddenly get worse.  · You leak stool or have blood in your stool.  · Your abdomen is bloated.  · You have severe pain in your abdomen.  · You feel dizzy or you faint.  This information is not intended to replace advice given to you by your health care  provider. Make sure you discuss any questions you have with your health care provider.  Document Released: 09/15/2005 Document Revised: 11/30/2018 Document Reviewed: 06/07/2017  [employer]vier Patient Education © 2020 [employer]

## 2021-05-05 NOTE — PROGRESS NOTES
Subjective:   Brennan Farooq Jr. is a 65 y.o. male who presents for Abdominal Pain (x 4-5 dyas, ache feeling, ) and Sweats (cold sweat feeling in the morning )        HPI     The patient presents to urgent care today for generalized abdominal pain.  The symptoms have been present for 4-5 days.  They are intermittent lasting approximately 2 hours.  The symptoms started before eating and sometimes improves with eating. No nausea or vomiting. No fever or chills. He has noticed increase belching.  No cough, congestion.  No shortness of breath or chest pain.  His last bowel movement was today described as normal.  There is been no blood in his stool.    He has been seen for similar episodes by his primary care provider. His abdominal pain originally started in December shortly before his wife's passing. He was not able to address symptoms until February, he was started on omeprazole with minimal relief. His sx continued to wax and wane. He was a seen again by his pcp who ordered a CT on 3/11/2021 showing no acute abnormalities.     ROS    PMH:  has a past medical history of Dyslipidemia, GERD (gastroesophageal reflux disease), Hypertension, and Moderate aortic stenosis.  MEDS:   Current Outpatient Medications:   •  amLODIPine (NORVASC) 2.5 MG Tab, TAKE 1 TABLET BY MOUTH EVERY DAY, Disp: 100 tablet, Rfl: 4  •  simvastatin (ZOCOR) 10 MG Tab, Take 1 Tab by mouth every day. N THE EVENING, Disp: 90 Tab, Rfl: 3  •  Famotidine (PEPCID PO), Take  by mouth., Disp: , Rfl:   ALLERGIES: No Known Allergies  SURGHX: History reviewed. No pertinent surgical history.  SOCHX:  reports that he has never smoked. He has never used smokeless tobacco. He reports current alcohol use of about 0.6 oz of alcohol per week. He reports that he does not use drugs.  Family History   Problem Relation Age of Onset   • Arthritis Mother    • Alcohol/Drug Mother    • Alcohol/Drug Father    • Hypertension Father    • Other Father 46        brain  "aneurysm rupture   • Heart Disease Neg Hx         Objective:   /90   Pulse 83   Temp 36.9 °C (98.5 °F) (Temporal)   Resp 18   Ht 1.753 m (5' 9\")   Wt 79.4 kg (175 lb)   SpO2 97%   BMI 25.84 kg/m²     Physical Exam  Vitals reviewed.   Constitutional:       General: He is not in acute distress.     Appearance: Normal appearance. He is well-developed. He is not ill-appearing.   HENT:      Head: Normocephalic and atraumatic.      Right Ear: External ear normal.      Left Ear: External ear normal.      Nose: Nose normal.      Mouth/Throat:      Mouth: Mucous membranes are moist.   Eyes:      Conjunctiva/sclera: Conjunctivae normal.      Pupils: Pupils are equal, round, and reactive to light.   Neck:      Trachea: No tracheal deviation.   Cardiovascular:      Rate and Rhythm: Normal rate and regular rhythm.   Pulmonary:      Effort: Pulmonary effort is normal.      Breath sounds: Normal breath sounds.   Abdominal:      General: There is no distension.      Palpations: Abdomen is soft.      Tenderness: There is no abdominal tenderness. There is no guarding or rebound.   Musculoskeletal:      Cervical back: Normal range of motion and neck supple.   Skin:     General: Skin is warm and dry.      Capillary Refill: Capillary refill takes less than 2 seconds.   Neurological:      General: No focal deficit present.      Mental Status: He is alert and oriented to person, place, and time.   Psychiatric:         Mood and Affect: Mood normal.         Behavior: Behavior normal.           Assessment/Plan:     1. Generalized abdominal pain  POCT Urinalysis    [id number] VIEW    CBC WITH DIFFERENTIAL    Comp Metabolic Panel    LIPASE    AMB REFERRAL BACK TO [facility]    REFERRAL TO GASTROENTEROLOGY     Reviewed CT completed on 3/11/21: No acute findings and diverticulosis       UA: neg  Ab XR impression: There is moderate colonic stool  CBC: no leukocytosis   BMP: wnl   Lipase: wnl     Pt labs negative for signs of acute " infection, pancreatitis, hepatitis inflammation, etc. Patient's abdominal discomfort consistent with stool pattern seen on x-ray.  Recommended starting magnesium citrate and senna stool softener.  He can use a high-fiber diet, increasing fluids and MiraLAX for long-term maintenance.  Constipation handout provided.    A referral was placed to follow-up with gastroenterology for further work up. If symptoms worsen or persist patient can return to clinic for reevaluation.  Red flags and emergency room precautions discussed. Patient confirmed understanding of information.    Please note that this dictation was created using voice recognition software. I have made every reasonable attempt to correct obvious errors, but I expect that there are errors of grammar and possibly content that I did not discover before finalizing the note.

## 2021-05-06 ENCOUNTER — HOSPITAL ENCOUNTER (OUTPATIENT)
Dept: LAB | Facility: MEDICAL CENTER | Age: 66
End: 2021-05-06
Attending: PHYSICIAN ASSISTANT
Payer: MEDICARE

## 2021-05-06 DIAGNOSIS — R10.84 GENERALIZED ABDOMINAL PAIN: ICD-10-CM

## 2021-05-06 LAB
ALBUMIN SERPL BCP-MCNC: 4.8 G/DL (ref 3.2–4.9)
ALBUMIN/GLOB SERPL: 1.7 G/DL
ALP SERPL-CCNC: 51 U/L (ref 30–99)
ALT SERPL-CCNC: 12 U/L (ref 2–50)
ANION GAP SERPL CALC-SCNC: 7 MMOL/L (ref 7–16)
AST SERPL-CCNC: 19 U/L (ref 12–45)
BASOPHILS # BLD AUTO: 0.7 % (ref 0–1.8)
BASOPHILS # BLD: 0.04 K/UL (ref 0–0.12)
BILIRUB SERPL-MCNC: 1.1 MG/DL (ref 0.1–1.5)
BUN SERPL-MCNC: 16 MG/DL (ref 8–22)
CALCIUM SERPL-MCNC: 9.9 MG/DL (ref 8.4–10.2)
CHLORIDE SERPL-SCNC: 101 MMOL/L (ref 96–112)
CO2 SERPL-SCNC: 29 MMOL/L (ref 20–33)
CREAT SERPL-MCNC: 1.18 MG/DL (ref 0.5–1.4)
EOSINOPHIL # BLD AUTO: 0.2 K/UL (ref 0–0.51)
EOSINOPHIL NFR BLD: 3.6 % (ref 0–6.9)
ERYTHROCYTE [DISTWIDTH] IN BLOOD BY AUTOMATED COUNT: 43.4 FL (ref 35.9–50)
GLOBULIN SER CALC-MCNC: 2.9 G/DL (ref 1.9–3.5)
GLUCOSE SERPL-MCNC: 119 MG/DL (ref 65–99)
HCT VFR BLD AUTO: 44 % (ref 42–52)
HGB BLD-MCNC: 14.6 G/DL (ref 14–18)
IMM GRANULOCYTES # BLD AUTO: 0.01 K/UL (ref 0–0.11)
IMM GRANULOCYTES NFR BLD AUTO: 0.2 % (ref 0–0.9)
LIPASE SERPL-CCNC: 21 U/L (ref 7–58)
LYMPHOCYTES # BLD AUTO: 1.68 K/UL (ref 1–4.8)
LYMPHOCYTES NFR BLD: 30.5 % (ref 22–41)
MCH RBC QN AUTO: 31.5 PG (ref 27–33)
MCHC RBC AUTO-ENTMCNC: 33.2 G/DL (ref 33.7–35.3)
MCV RBC AUTO: 94.8 FL (ref 81.4–97.8)
MONOCYTES # BLD AUTO: 0.75 K/UL (ref 0–0.85)
MONOCYTES NFR BLD AUTO: 13.6 % (ref 0–13.4)
NEUTROPHILS # BLD AUTO: 2.82 K/UL (ref 1.82–7.42)
NEUTROPHILS NFR BLD: 51.4 % (ref 44–72)
NRBC # BLD AUTO: 0 K/UL
NRBC BLD-RTO: 0 /100 WBC
PLATELET # BLD AUTO: 248 K/UL (ref 164–446)
PMV BLD AUTO: 9.4 FL (ref 9–12.9)
POTASSIUM SERPL-SCNC: 4.7 MMOL/L (ref 3.6–5.5)
PROT SERPL-MCNC: 7.7 G/DL (ref 6–8.2)
RBC # BLD AUTO: 4.64 M/UL (ref 4.7–6.1)
SODIUM SERPL-SCNC: 137 MMOL/L (ref 135–145)
WBC # BLD AUTO: 5.5 K/UL (ref 4.8–10.8)

## 2021-05-06 PROCEDURE — 83690 ASSAY OF LIPASE: CPT

## 2021-05-06 PROCEDURE — 36415 COLL VENOUS BLD VENIPUNCTURE: CPT

## 2021-05-06 PROCEDURE — 85025 COMPLETE CBC W/AUTO DIFF WBC: CPT

## 2021-05-06 PROCEDURE — 80053 COMPREHEN METABOLIC PANEL: CPT

## 2021-05-18 ENCOUNTER — OFFICE VISIT (OUTPATIENT)
Dept: MEDICAL GROUP | Facility: MEDICAL CENTER | Age: 66
End: 2021-05-18
Payer: MEDICARE

## 2021-05-18 VITALS
OXYGEN SATURATION: 97 % | HEIGHT: 69 IN | TEMPERATURE: 97.8 F | HEART RATE: 70 BPM | WEIGHT: 184.2 LBS | RESPIRATION RATE: 18 BRPM | SYSTOLIC BLOOD PRESSURE: 124 MMHG | DIASTOLIC BLOOD PRESSURE: 64 MMHG | BODY MASS INDEX: 27.28 KG/M2

## 2021-05-18 DIAGNOSIS — R10.9 ABDOMINAL DISCOMFORT: ICD-10-CM

## 2021-05-18 DIAGNOSIS — L57.0 ACTINIC KERATOSES: ICD-10-CM

## 2021-05-18 PROCEDURE — 99214 OFFICE O/P EST MOD 30 MIN: CPT | Performed by: FAMILY MEDICINE

## 2021-05-18 RX ORDER — ZINC OXIDE 13 %
1 CREAM (GRAM) TOPICAL DAILY
COMMUNITY
End: 2021-08-12

## 2021-05-18 ASSESSMENT — FIBROSIS 4 INDEX: FIB4 SCORE: 1.48

## 2021-05-18 NOTE — PROGRESS NOTES
"[facility]  Progress Note  Established Patient    Subjective:   Brennan Farooq Jr. is a 65 y.o. male here today with a chief complaint of AK's. The patient is alone.     Abdominal discomfort  Several months ago the patient described left upper quadrant abdominal discomfort.  This persisted after omeprazole and I sent the patient for a CT scan which was reassuring.  The pain improved but recently he has developed some generalized abdominal discomfort which is intermittent and mild.  He went to the urgent care and an x-ray showed a possibility of constipation.  He was treated with magnesium citrate.  He continues to get intermittent generalized abdominal discomfort.  He was referred to GI and has an upcoming appointment in a few weeks.    Actinic keratoses  Significantly improved with 5-fluorouracil.      Current Outpatient Medications on File Prior to Visit   Medication Sig Dispense Refill   • Probiotic Product (PROBIOTIC DAILY) Cap Take 1 capsule by mouth every day.     • amLODIPine (NORVASC) 2.5 MG Tab TAKE 1 TABLET BY MOUTH EVERY  tablet 4   • simvastatin (ZOCOR) 10 MG Tab Take 1 Tab by mouth every day. N THE EVENING 90 Tab 3   • Famotidine (PEPCID PO) Take  by mouth.       No current facility-administered medications on file prior to visit.          Objective:     Vitals:    05/18/21 0841   BP: 124/64   BP Location: Left arm   Patient Position: Sitting   BP Cuff Size: Adult long   Pulse: 70   Resp: 18   Temp: 36.6 °C (97.8 °F)   TempSrc: Temporal   SpO2: 97%   Weight: 83.6 kg (184 lb 3.2 oz)   Height: 1.753 m (5' 9\")       Physical Exam:  General: alert in no apparent distress.   Skin: improved actinic keratoses on the face, no residual rash.  Abdomen: Soft, nontender, nondistended.        Assessment and Plan:     1. Abdominal discomfort  Agree with GI consultation as the patient may require endoscopy.  In the interim, I recommended the low FODMAPs diet and an H. pylori test.  - H. PYLORI, " UREA BREATH TEST, ADULT; Future    2. Actinic keratoses  - monitor, may require cryo in future.         Followup: Return if symptoms worsen or fail to improve.

## 2021-05-18 NOTE — ASSESSMENT & PLAN NOTE
Several months ago the patient described left upper quadrant abdominal discomfort.  This persisted after omeprazole and I sent the patient for a CT scan which was reassuring.  The pain improved but recently he has developed some generalized abdominal discomfort which is intermittent and mild.  He went to the urgent care and an x-ray showed a possibility of constipation.  He was treated with magnesium citrate.  He continues to get intermittent generalized abdominal discomfort.  He was referred to GI and has an upcoming appointment in a few weeks.

## 2021-06-02 ENCOUNTER — HOSPITAL ENCOUNTER (OUTPATIENT)
Dept: LAB | Facility: MEDICAL CENTER | Age: 66
End: 2021-06-02
Attending: FAMILY MEDICINE
Payer: MEDICARE

## 2021-06-02 DIAGNOSIS — R10.9 ABDOMINAL DISCOMFORT: ICD-10-CM

## 2021-06-02 PROCEDURE — 83013 H PYLORI (C-13) BREATH: CPT

## 2021-06-03 LAB — UREA BREATH TEST QL: NEGATIVE

## 2021-07-17 ENCOUNTER — PATIENT MESSAGE (OUTPATIENT)
Dept: HEALTH INFORMATION MANAGEMENT | Facility: OTHER | Age: 66
End: 2021-07-17

## 2021-08-09 ENCOUNTER — TELEPHONE (OUTPATIENT)
Dept: CARDIOLOGY | Facility: MEDICAL CENTER | Age: 66
End: 2021-08-09

## 2021-08-09 DIAGNOSIS — I35.0 MODERATE AORTIC STENOSIS: ICD-10-CM

## 2021-08-09 NOTE — TELEPHONE ENCOUNTER
GILBERTO    Pt called asking if he needs to get an echo done before his upcoming appt on 11/3/21. Please call Pt back at [phone].    Thank you

## 2021-08-10 NOTE — TELEPHONE ENCOUNTER
Called and LM on mobile number, called home number and it was forwarded to mobile number, LM on there as well to callback.    Per JM last OV note, echo Q1 year.  Echo ordered.

## 2021-08-12 ENCOUNTER — OFFICE VISIT (OUTPATIENT)
Dept: CARDIOLOGY | Facility: MEDICAL CENTER | Age: 66
End: 2021-08-12
Payer: MEDICARE

## 2021-08-12 VITALS
WEIGHT: 169.6 LBS | SYSTOLIC BLOOD PRESSURE: 128 MMHG | BODY MASS INDEX: 25.12 KG/M2 | HEART RATE: 96 BPM | OXYGEN SATURATION: 96 % | DIASTOLIC BLOOD PRESSURE: 70 MMHG | HEIGHT: 69 IN

## 2021-08-12 DIAGNOSIS — E78.5 DYSLIPIDEMIA: ICD-10-CM

## 2021-08-12 DIAGNOSIS — I35.0 MODERATE AORTIC STENOSIS: ICD-10-CM

## 2021-08-12 DIAGNOSIS — I10 ESSENTIAL HYPERTENSION: ICD-10-CM

## 2021-08-12 DIAGNOSIS — R00.2 PALPITATIONS: ICD-10-CM

## 2021-08-12 DIAGNOSIS — R73.01 ELEVATED FASTING BLOOD SUGAR: ICD-10-CM

## 2021-08-12 DIAGNOSIS — R53.82 CHRONIC FATIGUE: ICD-10-CM

## 2021-08-12 PROCEDURE — 99214 OFFICE O/P EST MOD 30 MIN: CPT | Performed by: NURSE PRACTITIONER

## 2021-08-12 RX ORDER — OMEPRAZOLE 20 MG/1
CAPSULE, DELAYED RELEASE ORAL
COMMUNITY
Start: 2021-06-08 | End: 2021-09-09

## 2021-08-12 ASSESSMENT — FIBROSIS 4 INDEX: FIB4 SCORE: 1.48

## 2021-08-12 NOTE — PROGRESS NOTES
"      Cardiology Clinic Follow-up Note    Date of note:    2021  Primary Care Provider: Mac Gentile M.D.    Name:             Brennan Farooq  YOB: 1955  MRN:               [id number]    CC: nausea, lightheaded, sweating, pre-syncopal episodes    Primary Cardiologist: Dr. Figueredo    Patient HPI:   Brennan Farooq is a 65 y.o. male with current medical problems including hypertension, dyslipidemia, and moderate aortic stenosis who is here for follow-up.    Interim History:  Mr. Farooq was last seen in this cardiology office by Dr. Figueredo on 21.  At that time he was stable from cardiac standpoint and told to seek yearly follow-up.    Sadly, his wife  of cancer in Dec, 2020.    He presents today with daughter after having 7 months of symptoms that include \"not feeling well, Light headedness, quizziness\". He has also had a few episodes of cold sweats and dizziness. He has been awoken at night with palpitations and also a feeling of shortness of breath. His wife used to tell him he snored. He has been sleeping with head elevated and feels he sleeps more comfortably    He denies chest pain, dyspnea on exertion, syncopal episodes, lower extremity swelling, and recent weight gain.     He has actually had recent weight loss 15lbs in 3 months. Diet is much decreased as he always has bloating despite addition of Gas-X. Seeing GI for work-up, recent CT abd, EGD/Colonoscopy negative for H.pylori and/polyps. Has F/U later this month.    Home BPs 135/70, not sure of pulse. Not able to check BP during dizzy/sweaty episode.    Cardiovascular Risk Factors:  1. Smoking status: never  2. Type II Diabetes Mellitus: no  3. Hypertension: yes  4. Dyslipidemia: yes  5. Family history of early Coronary Artery Disease in a first degree relative (Male less than 55 years of age; Female less than 65 years of age): None  6. Obesity and/or Metabolic Syndrome: BMI 25  7. Sedentary lifestyle: active, walks "       Review of systems:  All others systems reviewed and negative except for what is outlined in the above HPI    Past Medical History:   Diagnosis Date   • Dyslipidemia    • GERD (gastroesophageal reflux disease)    • Hypertension    • Moderate aortic stenosis      History reviewed. No pertinent surgical history.  Family History   Problem Relation Age of Onset   • Arthritis Mother    • Alcohol/Drug Mother    • Alcohol/Drug Father    • Hypertension Father    • Other Father 46        brain aneurysm rupture   • Heart Disease Neg Hx      Social History     Socioeconomic History   • Marital status:      Spouse name: Not on file   • Number of children: Not on file   • Years of education: Not on file   • Highest education level: Not on file   Occupational History   • Not on file   Tobacco Use   • Smoking status: Never Smoker   • Smokeless tobacco: Never Used   Vaping Use   • Vaping Use: Never used   Substance and Sexual Activity   • Alcohol use: Yes     Alcohol/week: 0.6 oz     Types: 1 Standard drinks or equivalent per week     Comment: Rare   • Drug use: No   • Sexual activity: Yes     Comment: Businessman   Other Topics Concern   • Not on file   Social History Narrative    Self-employed [employer].      Social Determinants of Health     Financial Resource Strain:    • Difficulty of Paying Living Expenses:    Food Insecurity:    • Worried About Running Out of Food in the Last Year:    • Ran Out of Food in the Last Year:    Transportation Needs:    • Lack of Transportation (Medical):    • Lack of Transportation (Non-Medical):    Physical Activity:    • Days of Exercise per Week:    • Minutes of Exercise per Session:    Stress:    • Feeling of Stress :    Social Connections:    • Frequency of Communication with Friends and Family:    • Frequency of Social Gatherings with Friends and Family:    • Attends Yazidism Services:    • Active Member of Clubs or Organizations:    • Attends Club or Organization  "Meetings:    • Marital Status:    Intimate Partner Violence:    • Fear of Current or Ex-Partner:    • Emotionally Abused:    • Physically Abused:    • Sexually Abused:      No Known Allergies  Current Outpatient Medications   Medication Sig Dispense Refill   • omeprazole (PRILOSEC) 20 MG delayed-release capsule      • amLODIPine (NORVASC) 2.5 MG Tab TAKE 1 TABLET BY MOUTH EVERY  tablet 4   • simvastatin (ZOCOR) 10 MG Tab Take 1 Tab by mouth every day. N THE EVENING 90 Tab 3     No current facility-administered medications for this visit.       Physical Exam:  Ambulatory Vitals  /70 (BP Location: Left arm, Patient Position: Sitting, BP Cuff Size: Adult)   Pulse 96   Ht 1.753 m (5' 9\")   Wt 76.9 kg (169 lb 9.6 oz)   SpO2 96%    BP Readings from Last 4 Encounters:   08/12/21 128/70   05/18/21 124/64   05/05/21 142/90   04/22/21 122/65       Weight/BMI: Body mass index is 25.05 kg/m².  Wt Readings from Last 4 Encounters:   08/12/21 76.9 kg (169 lb 9.6 oz)   05/18/21 83.6 kg (184 lb 3.2 oz)   05/05/21 79.4 kg (175 lb)   04/22/21 80.7 kg (178 lb)       General: No apparent distress. Well nourished.   Neck: No JVD. No caroid bruits, trachea midline  Lungs: CTAB. Normal effort, without crackles/rhonchi, no wheezing  Heart: RRR. Normal S1/S2, 3/6 systolic murmur, no rub. no lower extremity edema. 2+ radial pulses, 2+ DT pulses  Ext: No clubbing or cyanosis.  Abdomen: soft, non tender, non distended, no wilian hepatomegaly.  Neurological: No focal deficits, no facial asymmetry.  Normal speech.  Psychiatric: Appropriate affect, alert and oriented x 4.   Skin: Warm and dry, no rash.    Lab Data Review:  Lab Results   Component Value Date/Time    CHOLSTRLTOT 158 09/30/2019 06:14 AM    LDL 88 09/30/2019 06:14 AM    HDL 49 09/30/2019 06:14 AM    TRIGLYCERIDE 105 09/30/2019 06:14 AM       Lab Results   Component Value Date/Time    SODIUM 137 05/06/2021 08:51 AM    POTASSIUM 4.7 05/06/2021 08:51 AM    CHLORIDE 101 " 05/06/2021 08:51 AM    CO2 29 05/06/2021 08:51 AM    GLUCOSE 119 (H) 05/06/2021 08:51 AM    BUN 16 05/06/2021 08:51 AM    CREATININE 1.18 05/06/2021 08:51 AM     Lab Results   Component Value Date/Time    ALKPHOSPHAT 51 05/06/2021 08:51 AM    ASTSGOT 19 05/06/2021 08:51 AM    ALTSGPT 12 05/06/2021 08:51 AM    TBILIRUBIN 1.1 05/06/2021 08:51 AM      Lab Results   Component Value Date/Time    WBC 5.5 05/06/2021 08:51 AM         Cardiac Imaging and Procedures Review:    EKG dated 7/17/2019: My personal interpretation is NSR, artifact, otherwise normal      Echo dated 5/1/2019:   FINDINGS  Left Ventricle  Normal left ventricular chamber size. Mild concentric left ventricular   hypertrophy. Normal left ventricular systolic function. Left   ventricular ejection fraction is visually estimated to be 60%. Normal   regional wall motion. Normal diastolic function.    Right Ventricle  Normal right ventricular size and systolic function.    Right Atrium  Normal right atrial size. Normal inferior vena cava size and   inspiratory collapse.    Left Atrium  Normal left atrial size.    Mitral Valve  Structurally normal mitral valve. No mitral stenosis. No mitral   regurgitation.    Aortic Valve  Moderate aortic sclerosis. Tricuspid aortic valve.  Vmax is 3.25  m/s.    Transvalvular gradients are - Peak: 42 mmHg, Mean: 26 mmHg. Mild aortic   insufficiency. Pressure half time is 660   msec.    Tricuspid Valve  Structurally normal tricuspid valve. No tricuspid stenosis. Trace   tricuspid regurgitation. Unable to estimate pulmonary artery pressure   due to an inadequate tricuspid regurgitant jet.    Pulmonic Valve  The pulmonic valve is not well visualized. Trace pulmonic   insufficiency.    Pericardium  Normal pericardium without effusion. Fat pad present.    Aorta  The aortic root is normal.  Ascending aorta is dilated with a diameter   of  4.1 cm.     Radiology test review   7/25/2019 CCS:  Coronary calcification:  LMA - 0.0  LCX -  0.0  LAD - 35.5  RCA - 0.0  PDA - 0.0     Total Calcium Score: 35.5    Assessment and Clinical Decision Makin. Essential hypertension     2. Dyslipidemia  Lipid Profile   3. Moderate aortic stenosis  EC-ECHOCARDIOGRAM COMPLETE W/O CONT   4. Chronic fatigue  EC-ECHOCARDIOGRAM COMPLETE W/O CONT    VITAMIN D,25 HYDROXY   5. Palpitations  EC-ECHOCARDIOGRAM COMPLETE W/O CONT    Holter Monitor Study   6. Elevated fasting blood sugar  HEMOGLOBIN A1C (Glycohemoglobin GHB Total/A1C with MBG Estimate)     The following treatment plan was discussed    HTN  -good control today, home B slightly elevated   -Continue 2.5mg of amlodipine    Chronic Fatigue  -Vitamin D level    Dyslipidemia  -Continue Atorva 10mg  -check lipid panel    Palpitations  -2 week Ziopatch    Elevated fasting blood glucose   -Check HgA1c    Moderate AS  -given current symptoms of lightheadedness, move to stat Echo as currently scheduled for mid Oct.   -Will follow-up with results     Also encouraged Pt receive COVID vaccination given current spike in community, no reason this is not indicated given current condition    Plan reviewed in detail with the patient, verbalizes understanding and is in agreement.  Pt is to follow up with myself to review Ziopatch after results up      Encouraged Pt to follow up with us over the phone or electronically using my e[employer] as cardiac issues/concerns arise.    PLEASE NOTE: This dictation was created using voice recognition software. I have made every reasonable attempt to correct obvious errors, but I expect that there are errors of grammar and possibly content that I did not discover before finalizing the note.     MIRA Sutton.   [facility]  [phone]    Collaborating Physician: Dr. Conley

## 2021-08-13 ENCOUNTER — APPOINTMENT (OUTPATIENT)
Dept: CARDIOLOGY | Facility: MEDICAL CENTER | Age: 66
End: 2021-08-13
Attending: NURSE PRACTITIONER
Payer: MEDICARE

## 2021-08-13 ENCOUNTER — APPOINTMENT (OUTPATIENT)
Dept: RADIOLOGY | Facility: MEDICAL CENTER | Age: 66
End: 2021-08-13
Attending: EMERGENCY MEDICINE
Payer: MEDICARE

## 2021-08-13 ENCOUNTER — HOSPITAL ENCOUNTER (OUTPATIENT)
Dept: LAB | Facility: MEDICAL CENTER | Age: 66
End: 2021-08-13
Attending: NURSE PRACTITIONER
Payer: MEDICARE

## 2021-08-13 ENCOUNTER — HOSPITAL ENCOUNTER (EMERGENCY)
Facility: MEDICAL CENTER | Age: 66
End: 2021-08-13
Attending: EMERGENCY MEDICINE
Payer: MEDICARE

## 2021-08-13 VITALS
TEMPERATURE: 97.9 F | SYSTOLIC BLOOD PRESSURE: 142 MMHG | BODY MASS INDEX: 24.2 KG/M2 | OXYGEN SATURATION: 97 % | HEART RATE: 85 BPM | WEIGHT: 169 LBS | DIASTOLIC BLOOD PRESSURE: 79 MMHG | RESPIRATION RATE: 16 BRPM | HEIGHT: 70 IN

## 2021-08-13 DIAGNOSIS — E78.5 DYSLIPIDEMIA: ICD-10-CM

## 2021-08-13 DIAGNOSIS — R73.01 ELEVATED FASTING BLOOD SUGAR: ICD-10-CM

## 2021-08-13 DIAGNOSIS — R53.82 CHRONIC FATIGUE: ICD-10-CM

## 2021-08-13 DIAGNOSIS — R07.9 ACUTE CHEST PAIN: ICD-10-CM

## 2021-08-13 LAB
25(OH)D3 SERPL-MCNC: 27 NG/ML (ref 30–100)
ALBUMIN SERPL BCP-MCNC: 4.7 G/DL (ref 3.2–4.9)
ALBUMIN/GLOB SERPL: 1.4 G/DL
ALP SERPL-CCNC: 85 U/L (ref 30–99)
ALT SERPL-CCNC: 15 U/L (ref 2–50)
ANION GAP SERPL CALC-SCNC: 13 MMOL/L (ref 7–16)
AST SERPL-CCNC: 19 U/L (ref 12–45)
BASOPHILS # BLD AUTO: 0.6 % (ref 0–1.8)
BASOPHILS # BLD: 0.03 K/UL (ref 0–0.12)
BILIRUB SERPL-MCNC: 0.7 MG/DL (ref 0.1–1.5)
BUN SERPL-MCNC: 15 MG/DL (ref 8–22)
CALCIUM SERPL-MCNC: 9.7 MG/DL (ref 8.4–10.2)
CHLORIDE SERPL-SCNC: 102 MMOL/L (ref 96–112)
CHOLEST SERPL-MCNC: 140 MG/DL (ref 100–199)
CO2 SERPL-SCNC: 26 MMOL/L (ref 20–33)
CREAT SERPL-MCNC: 1.01 MG/DL (ref 0.5–1.4)
EKG IMPRESSION: NORMAL
EOSINOPHIL # BLD AUTO: 0.07 K/UL (ref 0–0.51)
EOSINOPHIL NFR BLD: 1.4 % (ref 0–6.9)
ERYTHROCYTE [DISTWIDTH] IN BLOOD BY AUTOMATED COUNT: 41.3 FL (ref 35.9–50)
EST. AVERAGE GLUCOSE BLD GHB EST-MCNC: 114 MG/DL
FASTING STATUS PATIENT QL REPORTED: NORMAL
GLOBULIN SER CALC-MCNC: 3.4 G/DL (ref 1.9–3.5)
GLUCOSE SERPL-MCNC: 102 MG/DL (ref 65–99)
HBA1C MFR BLD: 5.6 % (ref 4–5.6)
HCT VFR BLD AUTO: 41.6 % (ref 42–52)
HDLC SERPL-MCNC: 48 MG/DL
HGB BLD-MCNC: 14.2 G/DL (ref 14–18)
IMM GRANULOCYTES # BLD AUTO: 0.02 K/UL (ref 0–0.11)
IMM GRANULOCYTES NFR BLD AUTO: 0.4 % (ref 0–0.9)
LDLC SERPL CALC-MCNC: 81 MG/DL
LYMPHOCYTES # BLD AUTO: 1.58 K/UL (ref 1–4.8)
LYMPHOCYTES NFR BLD: 31 % (ref 22–41)
MCH RBC QN AUTO: 31.7 PG (ref 27–33)
MCHC RBC AUTO-ENTMCNC: 34.1 G/DL (ref 33.7–35.3)
MCV RBC AUTO: 92.9 FL (ref 81.4–97.8)
MONOCYTES # BLD AUTO: 0.49 K/UL (ref 0–0.85)
MONOCYTES NFR BLD AUTO: 9.6 % (ref 0–13.4)
NEUTROPHILS # BLD AUTO: 2.91 K/UL (ref 1.82–7.42)
NEUTROPHILS NFR BLD: 57 % (ref 44–72)
NRBC # BLD AUTO: 0 K/UL
NRBC BLD-RTO: 0 /100 WBC
PLATELET # BLD AUTO: 246 K/UL (ref 164–446)
PMV BLD AUTO: 9.7 FL (ref 9–12.9)
POTASSIUM SERPL-SCNC: 4 MMOL/L (ref 3.6–5.5)
PROT SERPL-MCNC: 8.1 G/DL (ref 6–8.2)
RBC # BLD AUTO: 4.48 M/UL (ref 4.7–6.1)
SODIUM SERPL-SCNC: 141 MMOL/L (ref 135–145)
TRIGL SERPL-MCNC: 57 MG/DL (ref 0–149)
TROPONIN T SERPL-MCNC: 9 NG/L (ref 6–19)
WBC # BLD AUTO: 5.1 K/UL (ref 4.8–10.8)

## 2021-08-13 PROCEDURE — 80053 COMPREHEN METABOLIC PANEL: CPT

## 2021-08-13 PROCEDURE — 36415 COLL VENOUS BLD VENIPUNCTURE: CPT

## 2021-08-13 PROCEDURE — 85025 COMPLETE CBC W/AUTO DIFF WBC: CPT

## 2021-08-13 PROCEDURE — 99284 EMERGENCY DEPT VISIT MOD MDM: CPT

## 2021-08-13 PROCEDURE — 80061 LIPID PANEL: CPT

## 2021-08-13 PROCEDURE — 93005 ELECTROCARDIOGRAM TRACING: CPT

## 2021-08-13 PROCEDURE — 71045 X-RAY EXAM CHEST 1 VIEW: CPT

## 2021-08-13 PROCEDURE — 84484 ASSAY OF TROPONIN QUANT: CPT

## 2021-08-13 PROCEDURE — 93005 ELECTROCARDIOGRAM TRACING: CPT | Performed by: EMERGENCY MEDICINE

## 2021-08-13 PROCEDURE — 82306 VITAMIN D 25 HYDROXY: CPT

## 2021-08-13 PROCEDURE — 83036 HEMOGLOBIN GLYCOSYLATED A1C: CPT

## 2021-08-13 ASSESSMENT — FIBROSIS 4 INDEX: FIB4 SCORE: 1.48

## 2021-08-13 NOTE — ED NOTES
1600 VS rechecked and stable Pt asymptomatic at rest POC reviewed Awaiting ERP re evaln  1610 ERP at bedside to re eval

## 2021-08-13 NOTE — ED NOTES
"1340 Assessment done Pt reports episode of near syncope Associated with feeling unwell and brief episode of chest tightness No SOB Some nausea No emesis    Pt reports ongoing \" stomach \" issues  X 7 mons Saw GI and had negative endoscopy     Seeing cardiologist for AS  Booked for echocadiogram  Today but felt too unwell and diverted to ER    1345 Placed on heart monitor NSR NSL initiated Blood to labPOC reviewed and awaiting ERP keiko    1440 Seen by ERP      "

## 2021-08-13 NOTE — ED PROVIDER NOTES
"ED Provider Note    CHIEF COMPLAINT  Chief Complaint   Patient presents with   • Nausea     Reports feeling \"bloated\" as well. Reports recent colonoscopy \"came back fine\".    • Other     Reports donating blood today, then \"feeling queesy like I was going to pass out\". Reports this has been going on for some time now prior to transfusion. Reports \"waking up at night feeling SOB and some chest pressure\".   • Chest Pain       HPI  Brennan Farooq Jr. is a 65 y.o. male who presents with some chest discomfort and feeling poorly.  Patient has a long history of stomach bloating, abdominal symptoms.  Is been going on since the spring.  Work-up is included a CT, colonoscopy, EGD, all this returned \"fine.\"  He seems to been having 2 bad stays in one good day.  He has had episodes of racing heart.  4 days ago had episode of near syncope.  Describes standing up and feeling he was about to pass out.  This is early in the morning as he is going to the restroom.  He sat back down the bed, and things seem to get better.  Today he had an episode of some cold sweats dizziness, and some discomfort in his chest.  He no symptoms of subsequently resolved.  He had gone to the cardiologist yesterday and was set up for an echo today to evaluate his aortic stenosis.  However given the symptoms, he presents here to be evaluated.  It sounds like he is also set up for a monitor in the future.  There is no change in bowel or bladder, occasional nausea.  No fever chills.  No cough or cold symptoms.  He has not had a Covid vaccination yet.  There is no other complaint.    PAST MEDICAL HISTORY  Past Medical History:   Diagnosis Date   • Dyslipidemia    • GERD (gastroesophageal reflux disease)    • Hypertension    • Moderate aortic stenosis        FAMILY HISTORY  Family History   Problem Relation Age of Onset   • Arthritis Mother    • Alcohol/Drug Mother    • Alcohol/Drug Father    • Hypertension Father    • Other Father 46        brain aneurysm " "rupture   • Heart Disease Neg Hx        SOCIAL HISTORY  Social History     Tobacco Use   • Smoking status: Never Smoker   • Smokeless tobacco: Never Used   Vaping Use   • Vaping Use: Never used   Substance Use Topics   • Alcohol use: Not Currently     Alcohol/week: 0.6 oz     Types: 1 Standard drinks or equivalent per week     Comment: Rare   • Drug use: No         SURGICAL HISTORY  History reviewed. No pertinent surgical history.    CURRENT MEDICATIONS    I have reviewed the nurses notes and/or the list brought with the patient.    ALLERGIES  No Known Allergies    REVIEW OF SYSTEMS  See HPI for further details. Review of systems as above, otherwise all other systems are negative.     PHYSICAL EXAM  VITAL SIGNS: /79   Pulse 85   Temp 36.6 °C (97.9 °F) (Temporal)   Resp 16   Ht 1.778 m (5' 10\")   Wt 76.7 kg (169 lb)   SpO2 97%   BMI 24.25 kg/m²     Constitutional: Well appearing patient in no acute distress.  Not toxic, nor ill in appearance.  HENT: Mucus membranes moist.  Oropharynx is clear.  Eyes: Pupils equally round.  No scleral icterus.   Neck: Full nontender range of motion.  Lymphatic: No cervical lymphadenopathy noted.   Cardiovascular: Regular heart rate and rhythm.  There is a soft murmur at the left lower sternal border, holosystolic.  Very faint.  I do not hear a loud aortic murmur.  There is no rub or gallop.  Thorax & Lungs: Chest is nontender.  Lungs are clear to auscultation with good air movement bilaterally.  No wheeze, rhonchi, nor rales.   Abdomen: Soft, with no tenderness, rebound nor guarding.  No mass, pulsatile mass, nor hepatosplenomegaly appreciated.  Skin: No purpura nor petechia noted.  Extremities/Musculoskeletal: No sign of trauma.  Calves are nontender with no cords nor edema.  No Camila's sign.  Pulses are intact all around.   Neurologic: Alert & oriented.  Strength and sensation is intact all around.  Gait is normal.  Psychiatric: Normal affect appropriate for the " clinical situation.    EKG  I interpreted this EKG myself.  This is a 12-lead study.  The rhythm is sinus with a rate of 77.  There are no ST segment nor T wave abnormalities.  Interpretation: No ST segment elevation myocardial infarction.    LABS  Labs Reviewed   CBC WITH DIFFERENTIAL - Abnormal; Notable for the following components:       Result Value    RBC 4.48 (*)     Hematocrit 41.6 (*)     All other components within normal limits   COMP METABOLIC PANEL - Abnormal; Notable for the following components:    Glucose 102 (*)     All other components within normal limits   TROPONIN   ESTIMATED GFR         RADIOLOGY/PROCEDURES  I have reviewed the patient's film interpretations myself, and they are read out by the radiologist as:   DX-CHEST-PORTABLE (1 VIEW)   Final Result      Negative single view of the chest.        .    MEDICAL RECORD  I have reviewed patient's medical record and pertinent results are listed above.    COURSE & MEDICAL DECISION MAKING  I have reviewed any medical record information, laboratory studies and radiographic results as noted above.  This patient presents with a few different issues.  He had  several month history of bloating abdominal issues.  Simply this is been followed up by GI with no clear definitive answer.  I did review the CT myself, this was negative back in March.  With regards to his symptomatology of sweats, chest discomfort and racing heart, certainly makes me concerned about a cardiac etiology.  His heart score is low risk, getting a point for age as well as his 2 risk factors dyslipidemia and hypertension.  Troponin is negative this afternoon after having symptomatology this morning.  This is a normal EKG is reassuring.  He does have a history of aortic stenosis, moderate by report.  However I do not hear a loud murmur to suggest a tight AS at this time, although that is certainly considered.  I discussed the patient's case with Dr. Silveira, cardiology.  Relayed him the  patient's history, as well as his laboratory, radiographic and EKG.  He is recommended the patient keep his rescheduled appointment on Tuesday with echocardiogram.  I discussed this with the family, point he would likely also end up getting a monitor, his daughter points out he is already set up for this as well.  He was given reassurance at this time.  However, point out that should he have any new symptoms or turn for the worse he is return to the ER.  We talked about SARS-CoV-2 vaccination, I have recommended this.  His daughter will be keeping an eye on him.  He is discharged.      FINAL IMPRESSION  1. Acute chest pain           This dictation was created using voice recognition software.    Electronically signed by: Nadir Billingsley M.D., 8/13/2021 4:51 PM

## 2021-08-14 NOTE — ED NOTES
1650 D/C instn reviewed To F/U with cardiology for OPO echo and holter Return for worsening S/S Sustained CP or syncope VSS Currently feels fine at rest

## 2021-08-17 ENCOUNTER — HOSPITAL ENCOUNTER (OUTPATIENT)
Dept: CARDIOLOGY | Facility: MEDICAL CENTER | Age: 66
End: 2021-08-17
Attending: NURSE PRACTITIONER
Payer: MEDICARE

## 2021-08-17 DIAGNOSIS — I35.0 MODERATE AORTIC STENOSIS: ICD-10-CM

## 2021-08-17 DIAGNOSIS — R53.82 CHRONIC FATIGUE: ICD-10-CM

## 2021-08-17 DIAGNOSIS — R00.2 PALPITATIONS: ICD-10-CM

## 2021-08-17 PROCEDURE — 93306 TTE W/DOPPLER COMPLETE: CPT

## 2021-08-19 ENCOUNTER — TELEPHONE (OUTPATIENT)
Dept: CARDIOLOGY | Facility: MEDICAL CENTER | Age: 66
End: 2021-08-19

## 2021-08-19 LAB
LV EJECT FRACT  99904: 65
LV EJECT FRACT MOD 2C 99903: 55.19
LV EJECT FRACT MOD 4C 99902: 65.82
LV EJECT FRACT MOD BP 99901: 63.1

## 2021-08-19 PROCEDURE — 93306 TTE W/DOPPLER COMPLETE: CPT | Mod: 26 | Performed by: INTERNAL MEDICINE

## 2021-08-19 NOTE — TELEPHONE ENCOUNTER
MR    Patient called to advise they are looking for the results to their Echo and they have other questions. Pt was in the ER last Thursday, pt feels hot but temp is normal, Pt then feels cold again temp is normal. ER did xray, EKG, and blood work. They said everything looks fine. Pt was feeling nauseous  Pt can be reached at [phone].    Thank you,  Samreen GARRIDO

## 2021-08-20 ENCOUNTER — HOSPITAL ENCOUNTER (OUTPATIENT)
Facility: MEDICAL CENTER | Age: 66
End: 2021-08-20
Attending: FAMILY MEDICINE
Payer: MEDICARE

## 2021-08-20 ENCOUNTER — PATIENT MESSAGE (OUTPATIENT)
Dept: CARDIOLOGY | Facility: MEDICAL CENTER | Age: 66
End: 2021-08-20

## 2021-08-20 ENCOUNTER — OFFICE VISIT (OUTPATIENT)
Dept: MEDICAL GROUP | Facility: MEDICAL CENTER | Age: 66
End: 2021-08-20
Payer: MEDICARE

## 2021-08-20 ENCOUNTER — TELEPHONE (OUTPATIENT)
Dept: CARDIOLOGY | Facility: MEDICAL CENTER | Age: 66
End: 2021-08-20

## 2021-08-20 ENCOUNTER — NON-PROVIDER VISIT (OUTPATIENT)
Dept: CARDIOLOGY | Facility: MEDICAL CENTER | Age: 66
End: 2021-08-20
Payer: MEDICARE

## 2021-08-20 VITALS
RESPIRATION RATE: 18 BRPM | BODY MASS INDEX: 24.97 KG/M2 | OXYGEN SATURATION: 97 % | SYSTOLIC BLOOD PRESSURE: 128 MMHG | HEIGHT: 69 IN | WEIGHT: 168.6 LBS | TEMPERATURE: 98.5 F | DIASTOLIC BLOOD PRESSURE: 68 MMHG | HEART RATE: 103 BPM

## 2021-08-20 DIAGNOSIS — F41.9 ANXIETY: ICD-10-CM

## 2021-08-20 DIAGNOSIS — I35.0 SEVERE AORTIC STENOSIS: ICD-10-CM

## 2021-08-20 DIAGNOSIS — R23.2 FLUSHING: ICD-10-CM

## 2021-08-20 DIAGNOSIS — I47.10 SVT (SUPRAVENTRICULAR TACHYCARDIA) (HCC): ICD-10-CM

## 2021-08-20 DIAGNOSIS — I49.1 PREMATURE ATRIAL CONTRACTIONS: ICD-10-CM

## 2021-08-20 DIAGNOSIS — I49.3 PVC (PREMATURE VENTRICULAR CONTRACTION): ICD-10-CM

## 2021-08-20 LAB — COVID ORDER STATUS COVID19: NORMAL

## 2021-08-20 PROCEDURE — U0005 INFEC AGEN DETEC AMPLI PROBE: HCPCS

## 2021-08-20 PROCEDURE — U0003 INFECTIOUS AGENT DETECTION BY NUCLEIC ACID (DNA OR RNA); SEVERE ACUTE RESPIRATORY SYNDROME CORONAVIRUS 2 (SARS-COV-2) (CORONAVIRUS DISEASE [COVID-19]), AMPLIFIED PROBE TECHNIQUE, MAKING USE OF HIGH THROUGHPUT TECHNOLOGIES AS DESCRIBED BY CMS-2020-01-R: HCPCS

## 2021-08-20 PROCEDURE — 99214 OFFICE O/P EST MOD 30 MIN: CPT | Performed by: FAMILY MEDICINE

## 2021-08-20 RX ORDER — ONDANSETRON 4 MG/1
4 TABLET, FILM COATED ORAL 2 TIMES DAILY PRN
Qty: 30 TABLET | Refills: 0 | Status: ON HOLD | OUTPATIENT
Start: 2021-08-20 | End: 2021-10-04

## 2021-08-20 RX ORDER — LORAZEPAM 0.5 MG/1
0.5 TABLET ORAL 2 TIMES DAILY PRN
Qty: 20 TABLET | Refills: 0 | Status: SHIPPED | OUTPATIENT
Start: 2021-08-20 | End: 2021-10-01 | Stop reason: SDUPTHER

## 2021-08-20 ASSESSMENT — FIBROSIS 4 INDEX: FIB4 SCORE: 1.34

## 2021-08-20 NOTE — ASSESSMENT & PLAN NOTE
Back in December the patient developed left upper quadrant abdominal discomfort.  This persisted after omeprazole and I sent the patient for a CT scan which was reassuring.  The pain improved but he later developed generalized abdominal discomfort. An x-ray showed a possibility of constipation.  He was treated with magnesium citrate which did not help. He was referred to GI who prescribed omeprazole and performed an EGD and colonoscopy which was reassuring overall.  After the colonoscopy and EGD, however, the patient started to feel rather bad overall.  He developed chest pain, flushing, bloating, palpitations, near syncope, orthostasis, and an episode of cold sweats and nausea.  Reports unintended weight loss and lack of appetite. He went to the ER where a workup was unrevealing. His cancer screening is up-to-date with a normal PSA in February and recent normal colonoscopy.  He also had a CT scan of the abdomen pelvis and chest x-ray which were reassuring overall.  He did recently have an echocardiogram performed by his cardiologist showing severe aortic stenosis.  Of note, the patient for started experiencing these symptoms of abdominal discomfort, his wife was dying of cancer.  He does describe some ruminations, difficulty sleeping and anxiety.  He is asking for a pill for nausea.  He is also asking for a Covid test.

## 2021-08-20 NOTE — PROGRESS NOTES
"[facility]  Progress Note  Established Patient    Subjective:   Brennan Farooq Jr. is a 65 y.o. male here today with a chief complaint of flushing. The patient is accompanied by his daughter.     Flushing  Back in December the patient developed left upper quadrant abdominal discomfort.  This persisted after omeprazole and I sent the patient for a CT scan which was reassuring.  The pain improved but he later developed generalized abdominal discomfort. An x-ray showed a possibility of constipation.  He was treated with magnesium citrate which did not help. He was referred to GI who prescribed omeprazole and performed an EGD and colonoscopy which was reassuring overall.  After the colonoscopy and EGD, however, the patient started to feel rather bad overall.  He developed chest pain, flushing, bloating, palpitations, near syncope, orthostasis, and an episode of cold sweats and nausea.  Reports unintended weight loss and lack of appetite. He went to the ER where a workup was unrevealing. His cancer screening is up-to-date with a normal PSA in February and recent normal colonoscopy.  He also had a CT scan of the abdomen pelvis and chest x-ray which were reassuring overall.  He did recently have an echocardiogram performed by his cardiologist showing severe aortic stenosis.  Of note, the patient for started experiencing these symptoms of abdominal discomfort, his wife was dying of cancer.  He does describe some ruminations, difficulty sleeping and anxiety.  He is asking for a pill for nausea.  He is also asking for a Covid test.    Severe aortic stenosis  Noted on recent echo.     Anxiety  See discussion regarding \"flushing\".    Controlled Substance Assessment:   - Is the medication, if previously prescribed, working as intended and expected to treat   the patients symptoms: N/A.   - Does the patient have a history of substance abuse: no.   - Has the patient demonstrated aberrant behavior or intoxication: " no.   - Is there reason to believe that the patient is not using medication as prescribed or diverting the medication: no.   - Is there reason to believe that the patient is currently misusing this medication or addicted to the controlled substance: no.   - Is it necessary to verify that controlled substances, other that those authorized under the treatment plan, are not present in the body of this patient: no.  - Are there any blood or urine test that indicate inappropriate use of the medication or other controlled substances : no.   - I have reviewed the . Does the  report irregular/inconsistent medication use or indicate that the medication is being used inappropriately or that the patient is using another controlled substance not prescribed or known to me: no.   - Has the patient been issues another CS for the same medication from another provider for ongoing treatment:no.   - Is there reason to believe that the patient is using other drugs, including alcohol, prescription or illicit drugs, that may interact negatively with controlled substance or have not been prescribed by a practitioner who is treating the patient: no.   - Are there any known early refill attempts or claims that medication has been lost or stolen: no.   - Are there are any major changes in the patient's mental or physical health that would affect the medical appropriateness of this medication: no.   - Has the patient increased the dose of medication without provider authorization: no.  - Has the patient been reluctant to cooperate with any exam, analysis or test recommended by me: no.   - Has the patient demonstrated reluctance to stop or reduce use of the medicine: no.  - Has the patient requested or demanded a controlled substance that is likely to be abused or cause dependency or addiction: no.   - Is there any other evidence that the patient is chronically using opioids, misusing, abusing, illegally using or addicted to any drug or  "failing to comply with the instructions of the practitioner concerning the use of the controlled substance: no  - Are there any other factors that the practitioner determines is necessary to make an informed professional judgment concerning the medical appropriateness of the prescription: no.         Current Outpatient Medications on File Prior to Visit   Medication Sig Dispense Refill   • omeprazole (PRILOSEC) 20 MG delayed-release capsule      • amLODIPine (NORVASC) 2.5 MG Tab TAKE 1 TABLET BY MOUTH EVERY  tablet 4   • simvastatin (ZOCOR) 10 MG Tab Take 1 Tab by mouth every day. N THE EVENING 90 Tab 3     No current facility-administered medications on file prior to visit.          Objective:     Vitals:    08/20/21 1020   BP: 128/68   BP Location: Left arm   Patient Position: Sitting   BP Cuff Size: Adult long   Pulse: (!) 103   Resp: 18   Temp: 36.9 °C (98.5 °F)   TempSrc: Temporal   SpO2: 97%   Weight: 76.5 kg (168 lb 9.6 oz)   Height: 1.753 m (5' 9\")       Physical Exam:  General: alert in no apparent distress.   Cardio: 3/6 EVE.   Resp: CTAB.   Abd: soft, NTND, negative Wiseman sign.   Affect: anxious.         Assessment and Plan:     1. Flushing  Patient has a number of symptoms with a reassuring work-up up to this point with the exception of severe aortic stenosis.  Cancer screening is up-to-date.  Is conceivable that the aortic stenosis is largely responsible for most of the symptoms, however, I question whether or not the abdominal symptoms are due to the aortic stenosis.  Biliary dyskinesia is a consideration but labs and imaging and endoscopies at this point have been reassuring otherwise.  I also wonder how much anxiety is contributing.  I will prescribe the patient Ativan and Zofran. The patient was counseled on appropriate use of Ativan and was advised to only take the medicine as prescribed. The patient was advised of the risk of sedation and the importance of not taking this medicine with " other sedating. The patient was counseled not to drive on this medicine. He is requesting a Covid test.  I will follow-up for ensure that cardiology discusses the aortic stenosis with him as I suspect he will require an aortic valve replacement.  We should reconnect after the aortic valve replacement to see if his symptoms have resolved and if not we will continue working on it.  - COVID/SARS CoV-2 PCR; Future. Advised to self-isolate as we await COVID results.     2. Anxiety  - see above.   - LORazepam (ATIVAN) 0.5 MG Tab; Take 1 Tablet by mouth 2 times a day as needed (sleep or anxiety) for up to 10 days.  Dispense: 20 Tablet; Refill: 0    3. Severe aortic stenosis  - ER precautions discussed, sees cardiology. Will likely require aortic valve replacement.         Followup: Return in about 1 week (around 8/27/2021), or if symptoms worsen or fail to improve.

## 2021-08-20 NOTE — ASSESSMENT & PLAN NOTE
"See discussion regarding \"flushing\".    Controlled Substance Assessment:   - Is the medication, if previously prescribed, working as intended and expected to treat   the patients symptoms: N/A.   - Does the patient have a history of substance abuse: no.   - Has the patient demonstrated aberrant behavior or intoxication: no.   - Is there reason to believe that the patient is not using medication as prescribed or diverting the medication: no.   - Is there reason to believe that the patient is currently misusing this medication or addicted to the controlled substance: no.   - Is it necessary to verify that controlled substances, other that those authorized under the treatment plan, are not present in the body of this patient: no.  - Are there any blood or urine test that indicate inappropriate use of the medication or other controlled substances : no.   - I have reviewed the . Does the  report irregular/inconsistent medication use or indicate that the medication is being used inappropriately or that the patient is using another controlled substance not prescribed or known to me: no.   - Has the patient been issues another CS for the same medication from another provider for ongoing treatment:no.   - Is there reason to believe that the patient is using other drugs, including alcohol, prescription or illicit drugs, that may interact negatively with controlled substance or have not been prescribed by a practitioner who is treating the patient: no.   - Are there any known early refill attempts or claims that medication has been lost or stolen: no.   - Are there are any major changes in the patient's mental or physical health that would affect the medical appropriateness of this medication: no.   - Has the patient increased the dose of medication without provider authorization: no.  - Has the patient been reluctant to cooperate with any exam, analysis or test recommended by me: no.   - Has the patient demonstrated " reluctance to stop or reduce use of the medicine: no.  - Has the patient requested or demanded a controlled substance that is likely to be abused or cause dependency or addiction: no.   - Is there any other evidence that the patient is chronically using opioids, misusing, abusing, illegally using or addicted to any drug or failing to comply with the instructions of the practitioner concerning the use of the controlled substance: no  - Are there any other factors that the practitioner determines is necessary to make an informed professional judgment concerning the medical appropriateness of the prescription: no.

## 2021-08-20 NOTE — TELEPHONE ENCOUNTER
Home enrollment completed for the 14 day ePatch program per [place].  (enrollment under Ric Figueredo MD, patient's cardiologist)  Monitor to be shipped to patient by [employer].    >Currently pending EOS.

## 2021-08-22 LAB
SARS-COV-2 RNA RESP QL NAA+PROBE: NOTDETECTED
SPECIMEN SOURCE: NORMAL

## 2021-08-23 ENCOUNTER — TELEPHONE (OUTPATIENT)
Dept: CARDIOLOGY | Facility: MEDICAL CENTER | Age: 66
End: 2021-08-23

## 2021-08-23 DIAGNOSIS — I35.0 SEVERE AORTIC STENOSIS: ICD-10-CM

## 2021-08-23 NOTE — TELEPHONE ENCOUNTER
Call placed to Pt, discussing Echo results and CTS referral placed by PCP. Greatly appreciate their prompt collaboration. Pt will be calling to schedule first available allie with CTS. Discussed OHS briefly. Discussed current symptoms and ED precautions given.

## 2021-08-24 ENCOUNTER — OFFICE VISIT (OUTPATIENT)
Dept: MEDICAL GROUP | Facility: MEDICAL CENTER | Age: 66
End: 2021-08-24
Payer: MEDICARE

## 2021-08-24 VITALS
WEIGHT: 169.4 LBS | BODY MASS INDEX: 25.09 KG/M2 | SYSTOLIC BLOOD PRESSURE: 124 MMHG | HEIGHT: 69 IN | TEMPERATURE: 98.7 F | DIASTOLIC BLOOD PRESSURE: 64 MMHG | RESPIRATION RATE: 18 BRPM | OXYGEN SATURATION: 96 % | HEART RATE: 83 BPM

## 2021-08-24 DIAGNOSIS — I35.0 SEVERE AORTIC STENOSIS: ICD-10-CM

## 2021-08-24 DIAGNOSIS — R23.2 FLUSHING: ICD-10-CM

## 2021-08-24 DIAGNOSIS — F41.9 ANXIETY: ICD-10-CM

## 2021-08-24 PROCEDURE — 99214 OFFICE O/P EST MOD 30 MIN: CPT | Performed by: FAMILY MEDICINE

## 2021-08-24 ASSESSMENT — FIBROSIS 4 INDEX: FIB4 SCORE: 1.34

## 2021-08-24 NOTE — PROGRESS NOTES
"Subjective:     Brennan Farooq Jr. is a 65 y.o. male here today for flushinng and Annual Health Assessment.    Severe aortic stenosis  Scheduled to see cardiothoracic surgery.     Anxiety  See \"flushing\". Ativan does help with sleep.     Flushing  8/20/2021: Back in December the patient developed left upper quadrant abdominal discomfort.  This persisted after omeprazole and I sent the patient for a CT scan which was reassuring.  The pain improved but he later developed generalized abdominal discomfort. An x-ray showed a possibility of constipation.  He was treated with magnesium citrate which did not help. He was referred to GI who prescribed omeprazole and performed an EGD and colonoscopy which was reassuring overall.  After the colonoscopy and EGD, however, the patient started to feel rather bad overall.  He developed chest pain, flushing, bloating, palpitations, near syncope, orthostasis, and an episode of cold sweats and nausea.  Reports unintended weight loss and lack of appetite. He went to the ER where a workup was unrevealing. His cancer screening is up-to-date with a normal PSA in February and recent normal colonoscopy.  He also had a CT scan of the abdomen pelvis and chest x-ray which were reassuring overall.  He did recently have an echocardiogram performed by his cardiologist showing severe aortic stenosis.  Of note, the patient for started experiencing these symptoms of abdominal discomfort, his wife was dying of cancer.  He does describe some ruminations, difficulty sleeping and anxiety.  He is asking for a pill for nausea.  He is also asking for a Covid test.    08/24/21: Since last visit, patient referred to cardiothoracic surgery. He's scheduled to see them soon. Has good days and bad. Today, was feeling well until later in the day where he developed nausea and queasiness. Ativan helps with sleep. Has also seen GI who ordered a RUQ US.      Health Maintenance Summary                COVID-19 " Vaccine Overdue 10[birth year]1/1965     IMM ZOSTER VACCINES Overdue 10/1/2003     IMM INFLUENZA Next Due 9/1/2021     Annual Wellness Visit Next Due 2/18/2022      Done 2/17/2021 SUBSEQUENT ANNUAL WELLNESS VISIT-INCLUDES PPPS ()    IMM DTaP/Tdap/Td Vaccine Next Due 11/5/2028      Done 11/5/2018 Imm Admin: Tdap Vaccine    COLORECTAL CANCER SCREENING Next Due 8/10/2031            Annual Health Assessment Questions:     1.  Are you currently engaging in any exercise or physical activity? No, due to recent health condition     2.  How would you describe your mood or emotional well-being today? fair    3.  Have you had any falls in the last year? No    4.  Have you noticed any problems with your balance or had difficulty walking? No    5.  In the last six months have you experienced any leakage of urine? No    6. DPA/Advanced Directive: Patient does not have an Advanced Directive.  A packet and workshop information was given on Advanced Directives.    Current medicines (including changes today)  Current Outpatient Medications   Medication Sig Dispense Refill   • VITAMIN D PO Take 1,000 Int'l Units by mouth every day.     • LORazepam (ATIVAN) 0.5 MG Tab Take 1 Tablet by mouth 2 times a day as needed (sleep or anxiety) for up to 10 days. 20 Tablet 0   • ondansetron (ZOFRAN) 4 MG Tab tablet Take 1 Tablet by mouth 2 times a day as needed for Nausea/Vomiting. 30 Tablet 0   • omeprazole (PRILOSEC) 20 MG delayed-release capsule      • amLODIPine (NORVASC) 2.5 MG Tab TAKE 1 TABLET BY MOUTH EVERY  tablet 4   • simvastatin (ZOCOR) 10 MG Tab Take 1 Tab by mouth every day. N THE EVENING 90 Tab 3     No current facility-administered medications for this visit.       He  has a past medical history of Dyslipidemia, GERD (gastroesophageal reflux disease), Hypertension, and Moderate aortic stenosis.    Patient has no known allergies.    He  reports that he has never smoked. He has never used smokeless tobacco. He reports previous  "alcohol use of about 0.6 oz of alcohol per week. He reports that he does not use drugs.  Counseling given: Not Answered         Objective:     Physical Exam:  /64 (BP Location: Left arm, Patient Position: Sitting, BP Cuff Size: Adult long)   Pulse 83   Temp 37.1 °C (98.7 °F) (Temporal)   Resp 18   Ht 1.753 m (5' 9\")   Wt 76.8 kg (169 lb 6.4 oz)   SpO2 96%  Body mass index is 25.02 kg/m².   Constitutional: Alert, anxious.      Assessment and Plan:     1. Severe aortic stenosis  - awaiting consult from cardiothoracic surgery.     2. Flushing  Symptoms may be due to AS.   - pending RUQ US.   - recommended he speak with cardiothoracic surgery and cardiology about need for stress test, especially if planning on any surgery.   - will tx AS first then re-evaluate constellation of sx.     3. Anxiety  - offered mirtazapine, patient declines. Will continue judicious use of ativan.       Discussion today about general wellness and lifestyle habits:    · Engage in regular physical activity and social activities.  · Prevent falls and reduce trip hazards; using ambulatory aides, hearing and vision testing if appropriate.  · Steps to improve urinary incontinence.  · Advanced care planning.    Follow-Up: Return in about 2 weeks (around 9/7/2021), or if symptoms worsen or fail to improve.         PLEASE NOTE: This dictation was created using voice recognition software. I have made every reasonable attempt to correct obvious errors, but I expect that there are errors of grammar and possibly content that I did not discover before finalizing the note.  "

## 2021-08-25 NOTE — ASSESSMENT & PLAN NOTE
8/20/2021: Back in December the patient developed left upper quadrant abdominal discomfort.  This persisted after omeprazole and I sent the patient for a CT scan which was reassuring.  The pain improved but he later developed generalized abdominal discomfort. An x-ray showed a possibility of constipation.  He was treated with magnesium citrate which did not help. He was referred to GI who prescribed omeprazole and performed an EGD and colonoscopy which was reassuring overall.  After the colonoscopy and EGD, however, the patient started to feel rather bad overall.  He developed chest pain, flushing, bloating, palpitations, near syncope, orthostasis, and an episode of cold sweats and nausea.  Reports unintended weight loss and lack of appetite. He went to the ER where a workup was unrevealing. His cancer screening is up-to-date with a normal PSA in February and recent normal colonoscopy.  He also had a CT scan of the abdomen pelvis and chest x-ray which were reassuring overall.  He did recently have an echocardiogram performed by his cardiologist showing severe aortic stenosis.  Of note, the patient for started experiencing these symptoms of abdominal discomfort, his wife was dying of cancer.  He does describe some ruminations, difficulty sleeping and anxiety.  He is asking for a pill for nausea.  He is also asking for a Covid test.    08/24/21: Since last visit, patient referred to cardiothoracic surgery. He's scheduled to see them soon. Has good days and bad. Today, was feeling well until later in the day where he developed nausea and queasiness. Ativan helps with sleep. Has also seen GI who ordered a RUQ US.

## 2021-08-26 ENCOUNTER — HOSPITAL ENCOUNTER (OUTPATIENT)
Dept: RADIOLOGY | Facility: MEDICAL CENTER | Age: 66
End: 2021-08-26
Attending: INTERNAL MEDICINE
Payer: MEDICARE

## 2021-08-26 DIAGNOSIS — R14.1 GAS PAIN: ICD-10-CM

## 2021-08-26 DIAGNOSIS — R11.0 NAUSEA: ICD-10-CM

## 2021-08-26 DIAGNOSIS — I35.0 AORTIC VALVE STENOSIS, ETIOLOGY OF CARDIAC VALVE DISEASE UNSPECIFIED: ICD-10-CM

## 2021-08-26 DIAGNOSIS — R10.84 ABDOMINAL PAIN, GENERALIZED: ICD-10-CM

## 2021-08-26 PROCEDURE — 76705 ECHO EXAM OF ABDOMEN: CPT

## 2021-09-02 ENCOUNTER — PATIENT MESSAGE (OUTPATIENT)
Dept: MEDICAL GROUP | Facility: MEDICAL CENTER | Age: 66
End: 2021-09-02

## 2021-09-02 ENCOUNTER — OFFICE VISIT (OUTPATIENT)
Dept: CARDIOTHORACIC SURGERY | Facility: MEDICAL CENTER | Age: 66
End: 2021-09-02
Payer: MEDICARE

## 2021-09-02 VITALS
BODY MASS INDEX: 24.59 KG/M2 | DIASTOLIC BLOOD PRESSURE: 62 MMHG | HEART RATE: 106 BPM | WEIGHT: 166 LBS | HEIGHT: 69 IN | SYSTOLIC BLOOD PRESSURE: 124 MMHG | OXYGEN SATURATION: 98 % | TEMPERATURE: 97.2 F

## 2021-09-02 DIAGNOSIS — I35.0 AORTIC VALVE STENOSIS, ETIOLOGY OF CARDIAC VALVE DISEASE UNSPECIFIED: ICD-10-CM

## 2021-09-02 PROCEDURE — 99205 OFFICE O/P NEW HI 60 MIN: CPT | Performed by: THORACIC SURGERY (CARDIOTHORACIC VASCULAR SURGERY)

## 2021-09-02 ASSESSMENT — ENCOUNTER SYMPTOMS
EYES NEGATIVE: 1
RESPIRATORY NEGATIVE: 1
CARDIOVASCULAR NEGATIVE: 1
NAUSEA: 1
PSYCHIATRIC NEGATIVE: 1
DIZZINESS: 1
CONSTITUTIONAL NEGATIVE: 1
MUSCULOSKELETAL NEGATIVE: 1

## 2021-09-02 ASSESSMENT — FIBROSIS 4 INDEX: FIB4 SCORE: 1.34

## 2021-09-02 NOTE — PROGRESS NOTES
REFERRING PHYSICIAN: Mac Gentile MD.     CONSULTING PHYSICIAN: Vishal Carrasco MD, FACS.    CHIEF COMPLAINT: chest pain    HISTORY OF PRESENT ILLNESS: The patient is a 65 y.o. male with dyslipidemia, GERD, HTN and AS who has been having symptoms of dizziness, hot flashes, chills who saw Dr. Gentile who noted a heart murmur.  After a year of seeing Dr. Gentile he ordered an echo.  He has been having serial echos which recently went from moderate AS to severe AS.  He denies chest pain.  He does report symptoms of palpitations, flushing and lightheadedness.  He is wearing an event recorder at this time.  I been asked to evaluate the patient regarding his newly diagnosed severe aortic stenosis.    PAST MEDICAL HISTORY:   Past Medical History:   Diagnosis Date   • Dyslipidemia    • GERD (gastroesophageal reflux disease)    • Hypertension    • Moderate aortic stenosis      PAST SURGICAL HISTORY:   No past surgical history on file.    FAMILY HISTORY:   Family History   Problem Relation Age of Onset   • Arthritis Mother    • Alcohol/Drug Mother    • Alcohol/Drug Father    • Hypertension Father    • Other Father 46        brain aneurysm rupture   • Heart Disease Neg Hx         SOCIAL HISTORY:   Social History     Socioeconomic History   • Marital status:      Spouse name: Not on file   • Number of children: Not on file   • Years of education: Not on file   • Highest education level: Not on file   Occupational History   • Not on file   Tobacco Use   • Smoking status: Never Smoker   • Smokeless tobacco: Never Used   Vaping Use   • Vaping Use: Never used   Substance and Sexual Activity   • Alcohol use: Not Currently     Alcohol/week: 0.6 oz     Types: 1 Standard drinks or equivalent per week     Comment: Rare   • Drug use: No   • Sexual activity: Yes     Comment: Businessman   Other Topics Concern   • Not on file   Social History Narrative    Self-employed [employer].      Social Determinants of Health      Financial Resource Strain:    • Difficulty of Paying Living Expenses:    Food Insecurity:    • Worried About Running Out of Food in the Last Year:    • Ran Out of Food in the Last Year:    Transportation Needs:    • Lack of Transportation (Medical):    • Lack of Transportation (Non-Medical):    Physical Activity:    • Days of Exercise per Week:    • Minutes of Exercise per Session:    Stress:    • Feeling of Stress :    Social Connections:    • Frequency of Communication with Friends and Family:    • Frequency of Social Gatherings with Friends and Family:    • Attends [employer] Services:    • Active Member of Clubs or Organizations:    • Attends Club or Organization Meetings:    • Marital Status:    Intimate Partner Violence:    • Fear of Current or Ex-Partner:    • Emotionally Abused:    • Physically Abused:    • Sexually Abused:        ALLERGIES:   No Known Allergies     CURRENT MEDICATIONS:     Current Outpatient Medications:   •  VITAMIN D PO, Take 1,000 Int'l Units by mouth every day., Disp: , Rfl:   •  ondansetron (ZOFRAN) 4 MG Tab tablet, Take 1 Tablet by mouth 2 times a day as needed for Nausea/Vomiting., Disp: 30 Tablet, Rfl: 0  •  omeprazole (PRILOSEC) 20 MG delayed-release capsule, , Disp: , Rfl:   •  amLODIPine (NORVASC) 2.5 MG Tab, TAKE 1 TABLET BY MOUTH EVERY DAY, Disp: 100 tablet, Rfl: 4  •  simvastatin (ZOCOR) 10 MG Tab, Take 1 Tab by mouth every day. N THE EVENING, Disp: 90 Tab, Rfl: 3     LABS REVIEWED:  Lab Results   Component Value Date/Time    SODIUM 141 08/13/2021 01:45 PM    POTASSIUM 4.0 08/13/2021 01:45 PM    CHLORIDE 102 08/13/2021 01:45 PM    CO2 26 08/13/2021 01:45 PM    GLUCOSE 102 (H) 08/13/2021 01:45 PM    BUN 15 08/13/2021 01:45 PM    CREATININE 1.01 08/13/2021 01:45 PM      No results found for: PROTHROMBTM, INR   Lab Results   Component Value Date/Time    WBC 5.1 08/13/2021 01:45 PM    RBC 4.48 (L) 08/13/2021 01:45 PM    HEMOGLOBIN 14.2 08/13/2021 01:45 PM    HEMATOCRIT 41.6 (L)  08/13/2021 01:45 PM    MCV 92.9 08/13/2021 01:45 PM    MCH 31.7 08/13/2021 01:45 PM    MCHC 34.1 08/13/2021 01:45 PM    MPV 9.7 08/13/2021 01:45 PM    NEUTSPOLYS 57.00 08/13/2021 01:45 PM    LYMPHOCYTES 31.00 08/13/2021 01:45 PM    MONOCYTES 9.60 08/13/2021 01:45 PM    EOSINOPHILS 1.40 08/13/2021 01:45 PM    BASOPHILS 0.60 08/13/2021 01:45 PM        IMAGING REVIEWED AND INTERPRETED:    ECHOCARDIOGRAM:   Left ventricular ejection fraction is visually estimated to be 65%.  Severe aortic stenosis.  Transvalvular gradients are - Peak: 62 mmHg, Mean: 36 mmHg. Vmax is 4.0   m/s. KVNG 0.9 m2.      ANGIOGRAM:   pending    REVIEW OF SYSTEMS:   Review of Systems   Constitutional: Negative.    HENT: Negative.    Eyes: Negative.    Respiratory: Negative.    Cardiovascular: Negative.    Gastrointestinal: Positive for nausea.   Genitourinary: Negative.    Musculoskeletal: Negative.    Skin: Negative.    Neurological: Positive for dizziness.   Endo/Heme/Allergies: Negative.    Psychiatric/Behavioral: Negative.      PHYSICAL EXAMINATION:   Physical Exam  Vitals and nursing note reviewed.   Constitutional:       Appearance: Normal appearance.   HENT:      Head: Normocephalic and atraumatic.   Eyes:      Extraocular Movements: Extraocular movements intact.      Pupils: Pupils are equal, round, and reactive to light.   Cardiovascular:      Rate and Rhythm: Normal rate and regular rhythm.   Pulmonary:      Effort: Pulmonary effort is normal. No respiratory distress.   Abdominal:      General: Abdomen is flat.      Palpations: Abdomen is soft.   Musculoskeletal:         General: Normal range of motion.      Cervical back: Normal range of motion.   Skin:     General: Skin is warm and dry.   Neurological:      General: No focal deficit present.      Mental Status: He is alert and oriented to person, place, and time.   Psychiatric:         Mood and Affect: Mood normal.         Behavior: Behavior normal.         Thought Content: Thought  "content normal.         Judgment: Judgment normal.       /62 (BP Location: Left arm, Patient Position: Sitting, BP Cuff Size: Adult)   Pulse (!) 106   Temp 36.2 °C (97.2 °F) (Temporal)   Ht 1.753 m (5' 9\")   Wt 75.3 kg (166 lb)   SpO2 98%   BMI 24.51 kg/m²      IMPRESSION:  Severe symptomatic calcific or degenerative aortic stenosis.    PLAN:  I recommend TAVR, not surgical aortic valve replacement.  Patient to continue to be evaluated by the TAVR team.    The procedure, its risks, benefits, potential complications and alternative treatments were discussed with the patient in detail including the risks should he decide not to undergo my recommended treatment. All of his questions were answered to his satisfaction and he is willing to proceed with the operation. The risks include death, stroke,  infection: to include a rare bacterial infection related to the use of the heart/lung machine, ramírez-operative myocardial infarction, dysrhythmias, diaphragmatic paralysis, chest wall paresthesia, tracheostomy, kidney or other organ failure, possible return to the operating room for bleeding, bleeding requiring transfusion with its attendant risks including AIDS or hepatitis, dehiscence of surgical incisions, respiratory complications including the need for prolonged ventilator support, Protamine or other drug reaction, peripheral neuropathy, loss of limb, and miscount of surgical items.   The STS mortality risk score is 1% and the morbidity and mortality risk score is 7%. The scores were discussed with patient.    Sincerely,       Vishal Carrasco MD, FACS.      "

## 2021-09-03 RX ORDER — SERTRALINE HYDROCHLORIDE 25 MG/1
25 TABLET, FILM COATED ORAL DAILY
Qty: 30 TABLET | Refills: 0 | Status: SHIPPED | OUTPATIENT
Start: 2021-09-03 | End: 2021-09-09

## 2021-09-08 ENCOUNTER — OFFICE VISIT (OUTPATIENT)
Dept: MEDICAL GROUP | Facility: MEDICAL CENTER | Age: 66
End: 2021-09-08
Payer: MEDICARE

## 2021-09-08 VITALS
RESPIRATION RATE: 20 BRPM | BODY MASS INDEX: 24.5 KG/M2 | OXYGEN SATURATION: 95 % | HEIGHT: 69 IN | HEART RATE: 89 BPM | DIASTOLIC BLOOD PRESSURE: 64 MMHG | WEIGHT: 165.4 LBS | TEMPERATURE: 99 F | SYSTOLIC BLOOD PRESSURE: 124 MMHG

## 2021-09-08 DIAGNOSIS — R23.2 FLUSHING: ICD-10-CM

## 2021-09-08 DIAGNOSIS — F41.9 ANXIETY: ICD-10-CM

## 2021-09-08 DIAGNOSIS — I35.0 SEVERE AORTIC STENOSIS: ICD-10-CM

## 2021-09-08 PROCEDURE — 99214 OFFICE O/P EST MOD 30 MIN: CPT | Performed by: FAMILY MEDICINE

## 2021-09-08 ASSESSMENT — FIBROSIS 4 INDEX: FIB4 SCORE: 1.34

## 2021-09-09 ENCOUNTER — HOSPITAL ENCOUNTER (OUTPATIENT)
Dept: LAB | Facility: MEDICAL CENTER | Age: 66
End: 2021-09-09
Attending: FAMILY MEDICINE
Payer: MEDICARE

## 2021-09-09 DIAGNOSIS — R23.2 FLUSHING: ICD-10-CM

## 2021-09-09 LAB
ALBUMIN SERPL BCP-MCNC: 4.8 G/DL (ref 3.2–4.9)
ALBUMIN/GLOB SERPL: 1.7 G/DL
ALP SERPL-CCNC: 61 U/L (ref 30–99)
ALT SERPL-CCNC: 10 U/L (ref 2–50)
ANION GAP SERPL CALC-SCNC: 11 MMOL/L (ref 7–16)
AST SERPL-CCNC: 12 U/L (ref 12–45)
BASOPHILS # BLD AUTO: 0.6 % (ref 0–1.8)
BASOPHILS # BLD: 0.03 K/UL (ref 0–0.12)
BILIRUB SERPL-MCNC: 0.8 MG/DL (ref 0.1–1.5)
BUN SERPL-MCNC: 16 MG/DL (ref 8–22)
CALCIUM SERPL-MCNC: 10.1 MG/DL (ref 8.5–10.5)
CHLORIDE SERPL-SCNC: 101 MMOL/L (ref 96–112)
CO2 SERPL-SCNC: 25 MMOL/L (ref 20–33)
CREAT SERPL-MCNC: 1.11 MG/DL (ref 0.5–1.4)
EOSINOPHIL # BLD AUTO: 0.09 K/UL (ref 0–0.51)
EOSINOPHIL NFR BLD: 1.9 % (ref 0–6.9)
ERYTHROCYTE [DISTWIDTH] IN BLOOD BY AUTOMATED COUNT: 42.5 FL (ref 35.9–50)
GLOBULIN SER CALC-MCNC: 2.8 G/DL (ref 1.9–3.5)
GLUCOSE SERPL-MCNC: 108 MG/DL (ref 65–99)
HCT VFR BLD AUTO: 44.3 % (ref 42–52)
HGB BLD-MCNC: 14.7 G/DL (ref 14–18)
IMM GRANULOCYTES # BLD AUTO: 0.01 K/UL (ref 0–0.11)
IMM GRANULOCYTES NFR BLD AUTO: 0.2 % (ref 0–0.9)
LYMPHOCYTES # BLD AUTO: 1.47 K/UL (ref 1–4.8)
LYMPHOCYTES NFR BLD: 31.1 % (ref 22–41)
MCH RBC QN AUTO: 30.9 PG (ref 27–33)
MCHC RBC AUTO-ENTMCNC: 33.2 G/DL (ref 33.7–35.3)
MCV RBC AUTO: 93.3 FL (ref 81.4–97.8)
MONOCYTES # BLD AUTO: 0.6 K/UL (ref 0–0.85)
MONOCYTES NFR BLD AUTO: 12.7 % (ref 0–13.4)
NEUTROPHILS # BLD AUTO: 2.52 K/UL (ref 1.82–7.42)
NEUTROPHILS NFR BLD: 53.5 % (ref 44–72)
NRBC # BLD AUTO: 0 K/UL
NRBC BLD-RTO: 0 /100 WBC
PLATELET # BLD AUTO: 238 K/UL (ref 164–446)
PMV BLD AUTO: 10.2 FL (ref 9–12.9)
POTASSIUM SERPL-SCNC: 4 MMOL/L (ref 3.6–5.5)
PROT SERPL-MCNC: 7.6 G/DL (ref 6–8.2)
RBC # BLD AUTO: 4.75 M/UL (ref 4.7–6.1)
SODIUM SERPL-SCNC: 137 MMOL/L (ref 135–145)
TSH SERPL DL<=0.005 MIU/L-ACNC: 3.34 UIU/ML (ref 0.38–5.33)
WBC # BLD AUTO: 4.7 K/UL (ref 4.8–10.8)

## 2021-09-09 PROCEDURE — 85025 COMPLETE CBC W/AUTO DIFF WBC: CPT

## 2021-09-09 PROCEDURE — 84443 ASSAY THYROID STIM HORMONE: CPT

## 2021-09-09 PROCEDURE — 36415 COLL VENOUS BLD VENIPUNCTURE: CPT

## 2021-09-09 PROCEDURE — 83520 IMMUNOASSAY QUANT NOS NONAB: CPT

## 2021-09-09 PROCEDURE — 80053 COMPREHEN METABOLIC PANEL: CPT

## 2021-09-09 NOTE — ASSESSMENT & PLAN NOTE
Persists. Ativan helps. Took a single dose of zoloft but stopped it. Would like to try alternative.

## 2021-09-09 NOTE — ASSESSMENT & PLAN NOTE
Back in December the patient developed left upper quadrant abdominal discomfort.  This persisted after omeprazole and I sent the patient for a CT scan which was reassuring.  The pain improved but he later developed generalized abdominal discomfort. An x-ray showed a possibility of constipation.  He was treated with magnesium citrate which did not help. He was referred to GI who prescribed omeprazole and performed an EGD and colonoscopy which was reassuring overall. He has since stopped the omeprazole and is on pepcid, which seems to work better for him. After the colonoscopy and EGD, however, the patient started to feel rather bad overall.  He developed chest pain, flushing, bloating, palpitations, near syncope, orthostasis, and an episode of cold sweats and nausea. He also reports unintended weight loss. He went to the ER where a workup was unrevealing. He did have a troponin drawn which was normal. He has followed up with cardiology.  His cancer screening is up-to-date with a normal PSA in February and recent normal colonoscopy.  He also had a CT scan of the abdomen pelvis and chest x-ray which were reassuring overall.  He did recently have an echocardiogram performed by his cardiologist showing severe aortic stenosis.  He also describes anxiety. His symptoms persist. I started him on zoloft for the associated anxiety but he only took one dose and decided to stop the medicine. He would be interested in mirtazapine.

## 2021-09-09 NOTE — PROGRESS NOTES
[facility]  Progress Note  Established Patient    Subjective:   Brennan Farooq Jr. is a 65 y.o. male here today with a chief complaint of flushing.     Severe aortic stenosis  Has seen cardiothoracic surgery, planning on doing TAVR.    Anxiety  Persists. Ativan helps. Took a single dose of zoloft but stopped it. Would like to try alternative.     Flushing  Back in December the patient developed left upper quadrant abdominal discomfort.  This persisted after omeprazole and I sent the patient for a CT scan which was reassuring.  The pain improved but he later developed generalized abdominal discomfort. An x-ray showed a possibility of constipation.  He was treated with magnesium citrate which did not help. He was referred to GI who prescribed omeprazole and performed an EGD and colonoscopy which was reassuring overall. He has since stopped the omeprazole and is on pepcid, which seems to work better for him. After the colonoscopy and EGD, however, the patient started to feel rather bad overall.  He developed chest pain, flushing, bloating, palpitations, near syncope, orthostasis, and an episode of cold sweats and nausea. He also reports unintended weight loss. He went to the ER where a workup was unrevealing. He did have a troponin drawn which was normal. He has followed up with cardiology.  His cancer screening is up-to-date with a normal PSA in February and recent normal colonoscopy.  He also had a CT scan of the abdomen pelvis and chest x-ray which were reassuring overall.  He did recently have an echocardiogram performed by his cardiologist showing severe aortic stenosis.  He also describes anxiety. His symptoms persist. I started him on zoloft for the associated anxiety but he only took one dose and decided to stop the medicine. He would be interested in mirtazapine.      Current Outpatient Medications on File Prior to Visit   Medication Sig Dispense Refill   • VITAMIN D PO Take 1,000 Int'l Units by  "mouth every day.     • ondansetron (ZOFRAN) 4 MG Tab tablet Take 1 Tablet by mouth 2 times a day as needed for Nausea/Vomiting. 30 Tablet 0   • amLODIPine (NORVASC) 2.5 MG Tab TAKE 1 TABLET BY MOUTH EVERY  tablet 4   • simvastatin (ZOCOR) 10 MG Tab Take 1 Tab by mouth every day. N THE EVENING 90 Tab 3     No current facility-administered medications on file prior to visit.          Objective:     Vitals:    09/08/21 1635   BP: 124/64   BP Location: Left arm   Patient Position: Sitting   BP Cuff Size: Adult long   Pulse: 89   Resp: 20   Temp: 37.2 °C (99 °F)   TempSrc: Temporal   SpO2: 95%   Weight: 75 kg (165 lb 6.4 oz)   Height: 1.753 m (5' 9\")       Physical Exam:  General: alert in no apparent distress.           Assessment and Plan:     1. Flushing  The patient will follow with cardiology and cardiothoracic surgery for the cardiac symptomatology and GI for the GI symptomatology. Considering the other symptoms, in light of the cardiac and GI symptoms along with the other constitutional symptoms that persist, will pursue the following workup. DDx includes hyperthyroidism, carcinoid, pheo, anxiety. If the below workup is normal, may do trial of mirtazapine. Patient didn't like zoloft.   - TSH WITH REFLEX TO FT4; Future  - 5-HYDROXYINDOLACETIC ACID (5-HIAA); Future  - TRYPTASE; Future  - URINE CATECHOLAMINES FRACTIONATED; Future  - CBC WITH DIFFERENTIAL; Future  - Comp Metabolic Panel; Future    2. Severe aortic stenosis  - f/u cardiothoracic surgery.     3. Anxiety  - see above.       Followup: Return if symptoms worsen or fail to improve.         "

## 2021-09-10 ENCOUNTER — HOSPITAL ENCOUNTER (OUTPATIENT)
Facility: MEDICAL CENTER | Age: 66
End: 2021-09-10
Attending: FAMILY MEDICINE
Payer: MEDICARE

## 2021-09-10 DIAGNOSIS — R23.2 FLUSHING: ICD-10-CM

## 2021-09-10 LAB
APPEARANCE UR: CLEAR
BILIRUB UR QL STRIP.AUTO: NEGATIVE
COLOR UR: YELLOW
GLUCOSE UR STRIP.AUTO-MCNC: NEGATIVE MG/DL
KETONES UR STRIP.AUTO-MCNC: NEGATIVE MG/DL
LEUKOCYTE ESTERASE UR QL STRIP.AUTO: NEGATIVE
MICRO URNS: NORMAL
NITRITE UR QL STRIP.AUTO: NEGATIVE
PH UR STRIP.AUTO: 6 [PH] (ref 5–8)
PROT UR QL STRIP: NEGATIVE MG/DL
RBC UR QL AUTO: NEGATIVE
SP GR UR STRIP.AUTO: 1.02
UROBILINOGEN UR STRIP.AUTO-MCNC: 0.2 MG/DL

## 2021-09-10 PROCEDURE — 82384 ASSAY THREE CATECHOLAMINES: CPT

## 2021-09-10 PROCEDURE — 83497 ASSAY OF 5-HIAA: CPT

## 2021-09-10 PROCEDURE — 81003 URINALYSIS AUTO W/O SCOPE: CPT

## 2021-09-11 LAB — TRYPTASE SERPL-MCNC: 6.7 UG/L

## 2021-09-13 ENCOUNTER — TELEPHONE (OUTPATIENT)
Dept: CARDIOLOGY | Facility: MEDICAL CENTER | Age: 66
End: 2021-09-13

## 2021-09-13 ENCOUNTER — PATIENT MESSAGE (OUTPATIENT)
Dept: MEDICAL GROUP | Facility: MEDICAL CENTER | Age: 66
End: 2021-09-13

## 2021-09-13 DIAGNOSIS — R06.02 SHORTNESS OF BREATH: ICD-10-CM

## 2021-09-13 DIAGNOSIS — Z01.810 PRE-OPERATIVE CARDIOVASCULAR EXAMINATION: ICD-10-CM

## 2021-09-13 DIAGNOSIS — I35.0 SEVERE AORTIC STENOSIS: ICD-10-CM

## 2021-09-13 LAB
5HIAA & CREATININE UR-IMP: NORMAL
5OH-INDOLEACETATE 24H UR-MCNC: 1.2 MG/L
5OH-INDOLEACETATE 24H UR-MRATE: 3 MG/D (ref 0–15)
5OH-INDOLEACETATE/CREAT 24H UR: 2 MG/GCR (ref 0–14)

## 2021-09-13 RX ORDER — MIRTAZAPINE 15 MG/1
15 TABLET, FILM COATED ORAL
Qty: 30 TABLET | Refills: 0 | Status: SHIPPED | OUTPATIENT
Start: 2021-09-13 | End: 2021-10-06

## 2021-09-13 NOTE — TELEPHONE ENCOUNTER
Spoke with patient regarding scheduling SHP consult, CTA, and carotid duplex.     Reviewed apt details including date, time, location of such appointments. Patient reads back information accurately, states understanding and has no questions at this time. Assured patient details for such apts available via [employer] as well.     SC- FYI slot D 9/22 consult and testing

## 2021-09-14 LAB
CATECHOLS UR-IMP: NORMAL
COLLECT DURATION TIME SPEC: 24 HRS
CREAT 24H UR-MCNC: 54 MG/DL
CREAT 24H UR-MRATE: 1472 MG/D (ref 800–2100)
DOPAMINE 24H UR-MRATE: 87 UG/D (ref 71–485)
DOPAMINE UR-MCNC: 32 UG/L
DOPAMINE/CREAT UR: 59 UG/G CRT (ref 0–250)
EPINEPH 24H UR-MRATE: 5 UG/D (ref 1–14)
EPINEPH UR-MCNC: 2 UG/L
EPINEPH/CREAT UR: 4 UG/G CRT (ref 0–20)
NOREPINEPH 24H UR-MRATE: 35 UG/D (ref 14–120)
NOREPINEPH UR-MCNC: 13 UG/L
NOREPINEPH/CREAT UR: 24 UG/G CRT (ref 0–45)

## 2021-09-16 ENCOUNTER — TELEPHONE (OUTPATIENT)
Dept: CARDIOLOGY | Facility: MEDICAL CENTER | Age: 66
End: 2021-09-16

## 2021-09-16 NOTE — TELEPHONE ENCOUNTER
----- Message from Adrianne Currie R.N. sent at 9/16/2021 10:40 AM PDT -----  Regarding: pre TAVR cath prior to 10/4  Angie Henao,    Just wanted to send you heads up this patient is coming to clinic 9/22 and will need arranged for pre-TAVR cath prior to TAVR 10/4 if possible. I will assure MD puts in order.    Thanks,    Adrianne

## 2021-09-20 ENCOUNTER — TELEPHONE (OUTPATIENT)
Dept: CARDIOLOGY | Facility: MEDICAL CENTER | Age: 66
End: 2021-09-20

## 2021-09-21 ENCOUNTER — TELEMEDICINE (OUTPATIENT)
Dept: MEDICAL GROUP | Facility: MEDICAL CENTER | Age: 66
End: 2021-09-21
Payer: MEDICARE

## 2021-09-21 VITALS
SYSTOLIC BLOOD PRESSURE: 128 MMHG | WEIGHT: 165 LBS | TEMPERATURE: 98.3 F | HEART RATE: 90 BPM | HEIGHT: 69 IN | BODY MASS INDEX: 24.44 KG/M2 | DIASTOLIC BLOOD PRESSURE: 73 MMHG | OXYGEN SATURATION: 97 %

## 2021-09-21 DIAGNOSIS — R23.2 FLUSHING: ICD-10-CM

## 2021-09-21 DIAGNOSIS — R10.9 ABDOMINAL DISCOMFORT: ICD-10-CM

## 2021-09-21 PROCEDURE — 99214 OFFICE O/P EST MOD 30 MIN: CPT | Mod: 95 | Performed by: FAMILY MEDICINE

## 2021-09-21 ASSESSMENT — FIBROSIS 4 INDEX: FIB4 SCORE: 1.07

## 2021-09-21 NOTE — ASSESSMENT & PLAN NOTE
Back in December the patient developed left upper quadrant abdominal discomfort.  This persisted after omeprazole and I sent the patient for a CT scan which was reassuring.  The pain improved but he later developed generalized abdominal discomfort. An x-ray showed a possibility of constipation.  He was treated with magnesium citrate which did not help. He was referred to GI who prescribed omeprazole and performed an EGD and colonoscopy which was reassuring overall. After the colonoscopy and EGD, the patient started to feel rather bad overall.  He developed chest pain, flushing, bloating, palpitations, near syncope, orthostasis, and an episode of cold sweats and nausea. He also reports unintended weight loss. He went to the ER where a workup was unrevealing. He did have a troponin drawn which was normal. He has followed up with cardiology.  His cancer screening is up-to-date with a normal PSA in February and recent normal colonoscopy.  He also had a CT scan of the abdomen pelvis and chest x-ray which were reassuring overall.  He did recently have an echocardiogram performed by his cardiologist showing severe aortic stenosis.  He also describes anxiety. He tried zoloft but stopped after one dose. I recently got some urine endocrine studies which were normal and then started him on mirtzapine. He states he's doing much better. His weight has stabilized and he's sleeping better with improvement in flushing, bloating and anxiety.

## 2021-09-21 NOTE — PROGRESS NOTES
"Telemedicine Visit: Established Patient     This encounter was conducted via Zoom.   Verbal consent was obtained. Patient's identity was verified.    Subjective:     Brennan Farooq Jr. is a 65 y.o. male presenting for evaluation and management of flushing.    Flushing  Back in December the patient developed left upper quadrant abdominal discomfort.  This persisted after omeprazole and I sent the patient for a CT scan which was reassuring.  The pain improved but he later developed generalized abdominal discomfort. An x-ray showed a possibility of constipation.  He was treated with magnesium citrate which did not help. He was referred to GI who prescribed omeprazole and performed an EGD and colonoscopy which was reassuring overall. After the colonoscopy and EGD, the patient started to feel rather bad overall.  He developed chest pain, flushing, bloating, palpitations, near syncope, orthostasis, and an episode of cold sweats and nausea. He also reports unintended weight loss. He went to the ER where a workup was unrevealing. He did have a troponin drawn which was normal. He has followed up with cardiology.  His cancer screening is up-to-date with a normal PSA in February and recent normal colonoscopy.  He also had a CT scan of the abdomen pelvis and chest x-ray which were reassuring overall.  He did recently have an echocardiogram performed by his cardiologist showing severe aortic stenosis.  He also describes anxiety. He tried zoloft but stopped after one dose. I recently got some urine endocrine studies which were normal and then started him on mirtzapine. He states he's doing much better. His weight has stabilized and he's sleeping better with improvement in flushing, bloating and anxiety.     Abdominal discomfort  See \"flushing\".       No Known Allergies    Current medicines (including changes today)  Current Outpatient Medications   Medication Sig Dispense Refill   • mirtazapine (REMERON) 15 MG Tab Take 1 " "Tablet by mouth at bedtime. 30 Tablet 0   • VITAMIN D PO Take 1,000 Int'l Units by mouth every day.     • ondansetron (ZOFRAN) 4 MG Tab tablet Take 1 Tablet by mouth 2 times a day as needed for Nausea/Vomiting. 30 Tablet 0   • amLODIPine (NORVASC) 2.5 MG Tab TAKE 1 TABLET BY MOUTH EVERY  tablet 4   • simvastatin (ZOCOR) 10 MG Tab Take 1 Tab by mouth every day. N THE EVENING 90 Tab 3     No current facility-administered medications for this visit.       Patient Active Problem List    Diagnosis Date Noted   • Flushing 08/20/2021   • Anxiety 08/20/2021   • Actinic keratoses 03/31/2021   • Abdominal discomfort 01/28/2021   • Dysfunction of both eustachian tubes 12/18/2018   • Severe aortic stenosis 12/18/2018   • Impaired fasting glucose 12/18/2018   • Dyslipidemia 11/08/2018   • Gastroesophageal reflux disease without esophagitis 11/05/2018   • Healthcare maintenance 11/05/2018   • Dupuytren's contracture 11/05/2018   • Bilateral hand pain 11/05/2018   • Essential hypertension 11/05/2018       Family History   Problem Relation Age of Onset   • Arthritis Mother    • Alcohol/Drug Mother    • Alcohol/Drug Father    • Hypertension Father    • Other Father 46        brain aneurysm rupture   • Heart Disease Neg Hx        He  has a past medical history of Dyslipidemia, GERD (gastroesophageal reflux disease), Hypertension, and Moderate aortic stenosis.  He  has no past surgical history on file.       Objective:   Vitals obtained by patient:  /73 (BP Location: Left arm, Patient Position: Sitting)   Pulse 90   Temp 36.8 °C (98.3 °F) (Temporal)   Ht 1.753 m (5' 9\")   Wt 74.8 kg (165 lb)   SpO2 97%   BMI 24.37 kg/m²       Physical Exam:  Constitutional: Alert, no distress, well-groomed.  Psych: normal affect.      Assessment and Plan:     1. Flushing  I suspect the patient had IBS complicating anxiety with anxiety worsening IBS. I suspect the LUQ discomfort was colonic spasm. Recommended low fodmaps with IB vilma " for LUQ discomfort and continuation of mirtazapine .    2. Abdominal discomfort  - see above.           Follow-up: Return in about 2 weeks (around 10/5/2021), or if symptoms worsen or fail to improve.

## 2021-09-22 ENCOUNTER — DOCUMENTATION (OUTPATIENT)
Dept: CARDIOLOGY | Facility: MEDICAL CENTER | Age: 66
End: 2021-09-22

## 2021-09-22 ENCOUNTER — OFFICE VISIT (OUTPATIENT)
Dept: CARDIOLOGY | Facility: MEDICAL CENTER | Age: 66
End: 2021-09-22
Payer: MEDICARE

## 2021-09-22 ENCOUNTER — TELEPHONE (OUTPATIENT)
Dept: CARDIOLOGY | Facility: MEDICAL CENTER | Age: 66
End: 2021-09-22

## 2021-09-22 VITALS
SYSTOLIC BLOOD PRESSURE: 118 MMHG | BODY MASS INDEX: 24.59 KG/M2 | HEART RATE: 86 BPM | HEIGHT: 69 IN | DIASTOLIC BLOOD PRESSURE: 62 MMHG | WEIGHT: 166 LBS | RESPIRATION RATE: 12 BRPM | OXYGEN SATURATION: 98 %

## 2021-09-22 DIAGNOSIS — I35.0 SEVERE AORTIC STENOSIS: ICD-10-CM

## 2021-09-22 DIAGNOSIS — Z00.6 EXAMINATION OF PARTICIPANT IN CLINICAL TRIAL: ICD-10-CM

## 2021-09-22 PROCEDURE — 99205 OFFICE O/P NEW HI 60 MIN: CPT | Performed by: INTERNAL MEDICINE

## 2021-09-22 ASSESSMENT — FIBROSIS 4 INDEX: FIB4 SCORE: 1.07

## 2021-09-22 NOTE — PROGRESS NOTES
Met with patient and daughter Randi at [facility] consult. Reviewed plan of care including pre-op instructions. Assured this RN will contact patient next week to confirm CTA results, procedure date, and review pre-oop instructions and answer any questions that may arise prior to procedure. Provided patient with printed education and instructions as well. Patient and daughter agree with plan, state no questions at this time, and very thankful for review of plan of care today.       Psychosocial Assessment:    Mental Status Assessment:  Appearance: well groomed  Behavior: friendly  Mood/Affect: pleasant/normal  Thought process/content: normal  Cognition: normal  Functional ability: mild limation with activity r/t MONTERO  Dental Concerns: routine exams, no pain, native teeth  Dental Clearance warranted: none  Further mental assessment needed: none    Post-op Plan of Care:  Support systems: Daughter Jose, son Errol, and daughter Randi  Patient understands discharge plan: yes  DME: none, CPAP possibly in future per patient report  Home health warranted prior to procedure: n/a  Social concerns for discharge: none  PCP alerted of social concerns: n/a  POLST: n/a  Advance directive: will complete  Flu/PNA vaccines completed: declines    Concerns prior to procedure: none. Patient living independently and looking forward to treatment.     All patients questions and concerns were addressed during this visit. They understood pre-operative and post-operative plan of care.

## 2021-09-22 NOTE — TELEPHONE ENCOUNTER
Patient is scheduled on 9-29-21 for a Pre TAVR angio with Dr. Alfonso. No meds to stop and patient to check in at 9:00 for an 11:00 procedure. H&P was done on 9-22-21 by Dr. Alfonso. Pre admit to call patient.

## 2021-09-22 NOTE — PROGRESS NOTES
Structural heart Initial Consultation Note    Date of note:    9/22/2021    Primary Care Provider: Mac Gentile M.D.  Referring Provider: No ref. provider found     Patient Name: Brennan Farooq   YOB: 1955  MRN:              [id number]    Chief Complaint: Severe aortic stenosis    Brennan Farooq Jr. is a 65 y.o. male  patient referred by  for severe aortic stenosis. He has symptoms of chest pain, fatigue.  He also complains of occasional lightheadedness, palpitations.  His wife passed away in December last year, since then he has been having indigestion, nausea vomiting.  He had CT abdomen, upper and lower endoscopy, could not find any significant pathology.  He lost about 30 pounds in the last few months.    ROS    All other systems reviewed and discussed using a comprehensive questionnaire and are negative.     Past medical history, family history, social history, allergies and labs are reviewed and updated as needed as documented below.    Past Medical History:   Diagnosis Date   • Dyslipidemia    • GERD (gastroesophageal reflux disease)    • Hypertension    • Moderate aortic stenosis          History reviewed. No pertinent surgical history.      Current Outpatient Medications   Medication Sig Dispense Refill   • mirtazapine (REMERON) 15 MG Tab Take 1 Tablet by mouth at bedtime. 30 Tablet 0   • VITAMIN D PO Take 1,000 Int'l Units by mouth every day.     • ondansetron (ZOFRAN) 4 MG Tab tablet Take 1 Tablet by mouth 2 times a day as needed for Nausea/Vomiting. 30 Tablet 0   • amLODIPine (NORVASC) 2.5 MG Tab TAKE 1 TABLET BY MOUTH EVERY  tablet 4   • simvastatin (ZOCOR) 10 MG Tab Take 1 Tab by mouth every day. N THE EVENING 90 Tab 3     No current facility-administered medications for this visit.         No Known Allergies      Family History   Problem Relation Age of Onset   • Arthritis Mother    • Alcohol/Drug Mother    • Alcohol/Drug Father    • Hypertension  "Father    • Other Father 46        brain aneurysm rupture   • Heart Disease Neg Hx          Social History     Socioeconomic History   • Marital status:      Spouse name: Not on file   • Number of children: Not on file   • Years of education: Not on file   • Highest education level: Not on file   Occupational History   • Not on file   Tobacco Use   • Smoking status: Never Smoker   • Smokeless tobacco: Never Used   Vaping Use   • Vaping Use: Never used   Substance and Sexual Activity   • Alcohol use: Not Currently     Alcohol/week: 0.6 oz     Types: 1 Standard drinks or equivalent per week     Comment: Rare   • Drug use: No   • Sexual activity: Yes     Comment: Businessman   Other Topics Concern   • Not on file   Social History Narrative    Self-employed [employer].      Social Determinants of Health     Financial Resource Strain:    • Difficulty of Paying Living Expenses:    Food Insecurity:    • Worried About Running Out of Food in the Last Year:    • Ran Out of Food in the Last Year:    Transportation Needs:    • Lack of Transportation (Medical):    • Lack of Transportation (Non-Medical):    Physical Activity:    • Days of Exercise per Week:    • Minutes of Exercise per Session:    Stress:    • Feeling of Stress :    Social Connections:    • Frequency of Communication with Friends and Family:    • Frequency of Social Gatherings with Friends and Family:    • Attends [employer] Services:    • Active Member of Clubs or Organizations:    • Attends Club or Organization Meetings:    • Marital Status:    Intimate Partner Violence:    • Fear of Current or Ex-Partner:    • Emotionally Abused:    • Physically Abused:    • Sexually Abused:          Physical Exam:  Ambulatory Vitals  /62 (BP Location: Left arm, Patient Position: Sitting, BP Cuff Size: Adult)   Pulse 86   Resp 12   Ht 1.753 m (5' 9\")   Wt 75.3 kg (166 lb)   SpO2 98%    Oxygen Therapy:  Pulse Oximetry: 98 %  BP Readings from Last 4 " Encounters:   21 118/62   21 128/73   21 124/64   21 124/62       Weight/BMI: Body mass index is 24.51 kg/m².  Wt Readings from Last 4 Encounters:   21 75.3 kg (166 lb)   21 74.8 kg (165 lb)   21 75 kg (165 lb 6.4 oz)   21 75.3 kg (166 lb)         General: Well appearing and in no apparent distress  Head: atrumatic  Eyes: No conjunctival pallor   ENT: normal external appearance of nose and ears  Neck: JVD absent, carotid bruits absent  Lungs: respiratory sounds  normal, additional breath sounds absent  Heart: Regular rhythm,   No palpable thrills on palpation, 3 x 6 systolic murmur aortic area, no rubs,   Lower extremity edema absent.   Pedal pulses normal  Abdomen: soft, non tender, non distended.  Extremities/MSK: no clubbing, no cyanosis  Neurological: normal orientation, Gait normal   Psychiatric: Appropriate affect, intact judgement and insight  Skin: Warm extremities        Lab Data Review:  Lab Results   Component Value Date/Time    CHOLSTRLTOT 140 2021 06:13 AM    LDL 81 2021 06:13 AM    HDL 48 2021 06:13 AM    TRIGLYCERIDE 57 2021 06:13 AM       Lab Results   Component Value Date/Time    SODIUM 137 2021 06:08 AM    POTASSIUM 4.0 2021 06:08 AM    CHLORIDE 101 2021 06:08 AM    CO2 25 2021 06:08 AM    GLUCOSE 108 (H) 2021 06:08 AM    BUN 16 2021 06:08 AM    CREATININE 1.11 2021 06:08 AM     Lab Results   Component Value Date/Time    ALKPHOSPHAT 61 2021 06:08 AM    ASTSGOT 12 2021 06:08 AM    ALTSGPT 10 2021 06:08 AM    TBILIRUBIN 0.8 2021 06:08 AM      Lab Results   Component Value Date/Time    WBC 4.7 (L) 2021 06:08 AM     Echocardiogram 2021 reviewed, personally interpreted normal LV ejection fraction, severe aortic stenosis    Medical Decision Makin-year-old male patient with  1.  Severe symptomatic aortic stenosis, NYHA class II stage C  2.   Hypertension  3.  Dyslipidemia  4.  Dyspepsia    Is a good candidate for transcatheter aortic valve replacement.  Procedure, indications, complications, recovery explained in detail.  We will schedule CT scans, carotid Dopplers, coronary angiogram.    This note was dictated using Dragon speech recognition software.    Aaksh BINGHAM  Interventional cardiologist  [facility], [facility]  [address] [address]  Phone: [phone]  Fax: [phone]

## 2021-09-23 ENCOUNTER — HOSPITAL ENCOUNTER (OUTPATIENT)
Dept: RADIOLOGY | Facility: MEDICAL CENTER | Age: 66
End: 2021-09-23
Attending: INTERNAL MEDICINE
Payer: MEDICARE

## 2021-09-23 DIAGNOSIS — I35.0 SEVERE AORTIC STENOSIS: ICD-10-CM

## 2021-09-23 DIAGNOSIS — Z01.810 PRE-OPERATIVE CARDIOVASCULAR EXAMINATION: ICD-10-CM

## 2021-09-23 DIAGNOSIS — R06.02 SHORTNESS OF BREATH: ICD-10-CM

## 2021-09-23 PROBLEM — I77.9 MILD CAROTID ARTERY DISEASE (HCC): Status: ACTIVE | Noted: 2021-09-23

## 2021-09-23 PROCEDURE — 93880 EXTRACRANIAL BILAT STUDY: CPT

## 2021-09-23 PROCEDURE — 700117 HCHG RX CONTRAST REV CODE 255: Performed by: INTERNAL MEDICINE

## 2021-09-23 PROCEDURE — 71275 CT ANGIOGRAPHY CHEST: CPT

## 2021-09-23 PROCEDURE — 74174 CTA ABD&PLVS W/CONTRAST: CPT | Mod: ME

## 2021-09-23 RX ADMIN — IOHEXOL 100 ML: 350 INJECTION, SOLUTION INTRAVENOUS at 09:16

## 2021-09-24 ENCOUNTER — TELEPHONE (OUTPATIENT)
Dept: CARDIOLOGY | Facility: MEDICAL CENTER | Age: 66
End: 2021-09-24

## 2021-09-24 ENCOUNTER — HOSPITAL ENCOUNTER (EMERGENCY)
Facility: MEDICAL CENTER | Age: 66
End: 2021-09-24
Attending: EMERGENCY MEDICINE
Payer: MEDICARE

## 2021-09-24 VITALS
HEIGHT: 70 IN | DIASTOLIC BLOOD PRESSURE: 64 MMHG | TEMPERATURE: 97.5 F | RESPIRATION RATE: 16 BRPM | HEART RATE: 76 BPM | BODY MASS INDEX: 23.8 KG/M2 | WEIGHT: 166.23 LBS | SYSTOLIC BLOOD PRESSURE: 136 MMHG | OXYGEN SATURATION: 97 %

## 2021-09-24 DIAGNOSIS — N40.0 ENLARGED PROSTATE: ICD-10-CM

## 2021-09-24 DIAGNOSIS — R10.33 PERIUMBILICAL ABDOMINAL PAIN: ICD-10-CM

## 2021-09-24 DIAGNOSIS — K57.30 DIVERTICULOSIS OF COLON WITHOUT DIVERTICULITIS: ICD-10-CM

## 2021-09-24 PROBLEM — K57.90 DIVERTICULOSIS: Status: ACTIVE | Noted: 2021-09-24

## 2021-09-24 PROBLEM — N32.89 BLADDER WALL THICKENING: Status: ACTIVE | Noted: 2021-09-24

## 2021-09-24 LAB
ALBUMIN SERPL BCP-MCNC: 4.3 G/DL (ref 3.2–4.9)
ALBUMIN/GLOB SERPL: 1.6 G/DL
ALP SERPL-CCNC: 58 U/L (ref 30–99)
ALT SERPL-CCNC: 13 U/L (ref 2–50)
ANION GAP SERPL CALC-SCNC: 11 MMOL/L (ref 7–16)
APPEARANCE UR: CLEAR
AST SERPL-CCNC: 18 U/L (ref 12–45)
BACTERIA #/AREA URNS HPF: ABNORMAL /HPF
BASOPHILS # BLD AUTO: 0.8 % (ref 0–1.8)
BASOPHILS # BLD: 0.04 K/UL (ref 0–0.12)
BILIRUB SERPL-MCNC: 0.5 MG/DL (ref 0.1–1.5)
BILIRUB UR QL STRIP.AUTO: NEGATIVE
BUN SERPL-MCNC: 17 MG/DL (ref 8–22)
CALCIUM SERPL-MCNC: 9.3 MG/DL (ref 8.4–10.2)
CHLORIDE SERPL-SCNC: 105 MMOL/L (ref 96–112)
CO2 SERPL-SCNC: 24 MMOL/L (ref 20–33)
COLOR UR: YELLOW
CREAT SERPL-MCNC: 1.1 MG/DL (ref 0.5–1.4)
EOSINOPHIL # BLD AUTO: 0.1 K/UL (ref 0–0.51)
EOSINOPHIL NFR BLD: 1.9 % (ref 0–6.9)
ERYTHROCYTE [DISTWIDTH] IN BLOOD BY AUTOMATED COUNT: 42.5 FL (ref 35.9–50)
GLOBULIN SER CALC-MCNC: 2.7 G/DL (ref 1.9–3.5)
GLUCOSE SERPL-MCNC: 88 MG/DL (ref 65–99)
GLUCOSE UR STRIP.AUTO-MCNC: NEGATIVE MG/DL
HCT VFR BLD AUTO: 42.1 % (ref 42–52)
HGB BLD-MCNC: 14.1 G/DL (ref 14–18)
IMM GRANULOCYTES # BLD AUTO: 0.01 K/UL (ref 0–0.11)
IMM GRANULOCYTES NFR BLD AUTO: 0.2 % (ref 0–0.9)
KETONES UR STRIP.AUTO-MCNC: NEGATIVE MG/DL
LEUKOCYTE ESTERASE UR QL STRIP.AUTO: NEGATIVE
LIPASE SERPL-CCNC: 25 U/L (ref 7–58)
LYMPHOCYTES # BLD AUTO: 1.69 K/UL (ref 1–4.8)
LYMPHOCYTES NFR BLD: 32.9 % (ref 22–41)
MCH RBC QN AUTO: 31.8 PG (ref 27–33)
MCHC RBC AUTO-ENTMCNC: 33.5 G/DL (ref 33.7–35.3)
MCV RBC AUTO: 94.8 FL (ref 81.4–97.8)
MICRO URNS: ABNORMAL
MONOCYTES # BLD AUTO: 0.66 K/UL (ref 0–0.85)
MONOCYTES NFR BLD AUTO: 12.9 % (ref 0–13.4)
MUCOUS THREADS #/AREA URNS HPF: ABNORMAL /HPF
NEUTROPHILS # BLD AUTO: 2.63 K/UL (ref 1.82–7.42)
NEUTROPHILS NFR BLD: 51.3 % (ref 44–72)
NITRITE UR QL STRIP.AUTO: NEGATIVE
NRBC # BLD AUTO: 0 K/UL
NRBC BLD-RTO: 0 /100 WBC
PH UR STRIP.AUTO: 5.5 [PH] (ref 5–8)
PLATELET # BLD AUTO: 242 K/UL (ref 164–446)
PMV BLD AUTO: 9.4 FL (ref 9–12.9)
POTASSIUM SERPL-SCNC: 3.8 MMOL/L (ref 3.6–5.5)
PROT SERPL-MCNC: 7 G/DL (ref 6–8.2)
PROT UR QL STRIP: NEGATIVE MG/DL
RBC # BLD AUTO: 4.44 M/UL (ref 4.7–6.1)
RBC # URNS HPF: ABNORMAL /HPF
RBC UR QL AUTO: ABNORMAL
SODIUM SERPL-SCNC: 140 MMOL/L (ref 135–145)
SP GR UR STRIP.AUTO: 1.01
WBC # BLD AUTO: 5.1 K/UL (ref 4.8–10.8)
WBC #/AREA URNS HPF: ABNORMAL /HPF

## 2021-09-24 PROCEDURE — 36415 COLL VENOUS BLD VENIPUNCTURE: CPT

## 2021-09-24 PROCEDURE — 80053 COMPREHEN METABOLIC PANEL: CPT

## 2021-09-24 PROCEDURE — 85025 COMPLETE CBC W/AUTO DIFF WBC: CPT

## 2021-09-24 PROCEDURE — 83690 ASSAY OF LIPASE: CPT

## 2021-09-24 PROCEDURE — 81001 URINALYSIS AUTO W/SCOPE: CPT

## 2021-09-24 PROCEDURE — 99284 EMERGENCY DEPT VISIT MOD MDM: CPT

## 2021-09-24 RX ORDER — SUCRALFATE ORAL 1 G/10ML
1 SUSPENSION ORAL 4 TIMES DAILY
Qty: 560 ML | Refills: 0 | Status: ON HOLD | OUTPATIENT
Start: 2021-09-24 | End: 2021-09-29

## 2021-09-24 RX ORDER — TAMSULOSIN HYDROCHLORIDE 0.4 MG/1
0.4 CAPSULE ORAL EVERY EVENING
Qty: 90 CAPSULE | Refills: 0 | Status: SHIPPED | OUTPATIENT
Start: 2021-09-24 | End: 2021-10-11

## 2021-09-24 ASSESSMENT — FIBROSIS 4 INDEX: FIB4 SCORE: 1.07

## 2021-09-24 NOTE — TELEPHONE ENCOUNTER
"Spoke with patient regarding CTA results showing enlarged prostate as well as diverticulosis.    Patient reports that he does occasionally have urinary retention and difficulty maintaining urine stream when urinating at night. Denies all other sxs of enlarged prostate.     Discussed that APRN SC will like patient to begin flomax 0.4 mg PO every evening for enlarged prostate. Confirmed patient's preferred pharmacy. Rx sent to patient's preferred pharmacy.    Reviewed also diagnosis of diverticulosis found on CTA. Patient reports that he has been having significant abdominal pain that feels like sharp pains beneath his \"belly button\". He reports that he will also get \"hot in the chest and stomach\" and heart burn in his throat. He denies diarrhea and/or constipation, however he does report dark colored stool he believes is attributed to use of Pepto Bismol. Patient states he has had nausea without vomiting for approximately 3-4 days. He states that he takes multiple doses of Pepto Bismol in attempt to alleviate stomach pains, however he has taken 4 doses today without relief of abdominal pain.     Patient denies fever and chills. Patient takes temporal temperature during this call and reports temp of 98.1 degrees farenheit.      Updated APRN SC with all such abdominal sxs. Explained to patient that per APRN SC, she recommends for patient to report to [facility] Emergency Department for further assessment to assure that diverticulosis has not progressed to diverticulitis. Patient states that he will call his daughter and explain APRN's recommendation and agrees for ED assessment.      Patient agrees with plan to begin flomax and agrees with ED assessment. Patient states no questions at this time and very thankful for assistance and recommendations today.   "

## 2021-09-25 NOTE — ED TRIAGE NOTES
"Chief Complaint   Patient presents with   • Abdominal Pain     off and on for last 3-4 days, c/o nausea     /75   Pulse 83   Temp 36.3 °C (97.4 °F) (Temporal)   Resp 16   Ht 1.778 m (5' 10\")   Wt 75.4 kg (166 lb 3.6 oz)   SpO2 98%   BMI 23.85 kg/m²     Pt arrive w/ above concern, has been taking Pepto bismol for pain relief, reports \"darker stools since using it\"    Has this patient been vaccinated for COVID - NO  If not, would they like to be vaccinated while in the ER if eligible?  no  Would the patient like to speak with the ERP about the possibility of receiving the COVID vaccine today before making a decision? no    "

## 2021-09-25 NOTE — ED NOTES
CTA ABDOMEN PELVIS W & W/O POST PROCESS [[id number]] Resulted: 09/23/21 1557   Order Status: Completed Updated: 09/23/21 1600   Narrative:       9/23/2021 8:39 AM     HISTORY/REASON FOR EXAM:  Severe aortic stenosis, pre-TAVR       TECHNIQUE/EXAM DESCRIPTION:   CT angiogram of the chest without and with contrast, with reformations.     CT angiogram of the abdomen and pelvis without and with contrast with reformations.     Initial precontrast images were obtained from the top of the aortic arch through the aortic hiatus. Following this, 100 mL of Omnipaque 350 nonionic contrast was administered in an IV bolus fashion and postcontrast images obtained from the thoracic inlet    through the lung bases. Thin-section helical images were obtained. Also, coronal and parasagittal multiplanar volume reformats were generated from the axial data.     3D angiographic images were reviewed on PACS. Maximum intensity projection (MIP) images were generated and reviewed.     Low dose optimization technique was utilized for this CT exam including automated exposure control and adjustment of the mA and/or kV according to patient size.     COMPARISON:  CT abdomen and pelvis 9/23/2021     FINDINGS:     CTA chest:   Aortic root and valve:   Tricuspid aortic valve with moderate calcifications.     Annulus area: 544 mm?   Annulus diameter: 30.4 x 23.6 mm   Diameter at the level of the sinuses: 34.1 x 36.6 mm     Vertical distance from the aortic annulus to the coronary origin:   Left coronary ostium: 14.2  mm   Right coronary ostium 14.3 mm     Vertical distance from the aortic annulus to the sinotubular junction:   Left coronary sinus: 21.2 mm   Right coronary sinus: 23.9 mm     Ascending aortic diameter: 3.4 x 3.4 cm     Heart: There is left ventricular hypertrophy. No other abnormality.     CT chest:     The lungs are clear.     There is no adenopathy within the allie, mediastinum, or axilla.     There are no pleural effusion or  pneumothoraces.       Vascular anatomy:     Thoracic aorta: Atherosclerotic plaque without aneurysm     Abdominal aorta: Atherosclerotic plaque without aneurysm.     Celiac axis: Patent     Superior mesenteric artery: Patent     Right renal artery: Patent     Left renal artery: Patent     Inferior mesenteric artery: Patent     Right external iliac artery diameter: 8.7 mm     Left external iliac artery diameter: 7.6 mm         Abdomen and pelvis:     The liver is normal in appearance.     There is no biliary dilation. Gallbladder is within normal limits by CT assessment.     The spleen is normal in appearance.     The pancreas is normal in appearance.     The splenic vein and portal vein enhance normally.     Both adrenal glands are normal in appearance.     The right kidney is normal in appearance.     The left kidney is normal in appearance.     There is no hydronephrosis.     The bowel and mesentery there is mild left colon diverticulosis..     The aorta is normal in appearance.     The terminal ileum is normal in appearance.     The appendix is normal in appearance.     Pelvis:     The prostate is mildly enlarged.     There is diffuse bladder wall thickening consistent with muscular hypertrophy.     Osseous structures: No significant finding           3D angiographic/MIP images of the vasculature confirm the vascular findings as described above.    Impression:       1.  Mean aortic annular area 544 mm?     2.  Coronary arteries reaching greater than 10 mm from the aortic annulus     3.  Widely patent aortoiliac system with minimal diameter of right external iliac artery 8.7 mm and left external iliac artery 7.6 mm     4.  Left ventricular hypertrophy     5.  Enlarged prostate     6.  Bladder wall thickening consistent with muscular hypertrophy     7.  Mild left colon diverticulosis      Patient report he was told to come to the ED to make sure he does not have diverticulitis based on the CT Scan study done  yesterday.

## 2021-09-25 NOTE — ED PROVIDER NOTES
ED Provider Note    ED Provider Note    Scribed for Jaylan Alejandro MD by Jaylan Alejandro M.D.. 9/24/2021, 6:29 PM.    Primary care provider: Mac Gentile M.D.  Means of arrival: Private  History obtained from: Patient  History limited by: None    CHIEF COMPLAINT  Chief Complaint   Patient presents with   • Abdominal Pain     off and on for last 3-4 days, c/o nausea       HPI  Brennan Farooq Jr. is a 65 y.o. male who presents to the Emergency Department for evaluation of abdominal discomfort.  He has had what sounds like dyspepsia since he chronically has diagnosed with GERD.  He has been taking omeprazole 40 mg a day and is now taking omeprazole 20 mg a day.  He tells last for 4 days and had somewhat worsening discomfort localized to periumbilical region with associated nausea but no vomiting.  No fever.  He had a CT yesterday showing diverticulosis, he relates given his symptoms he was told to be assessed for potential diverticulitis and thusly came to the emergency department.  Has had no fever, no vomiting, no blood in stool.  He is not anticoagulated.  Has history of severe aortic stenosis for which CT imaging of the chest abdomen pelvis was done yesterday.    REVIEW OF SYSTEMS  Pertinent positives include periumbilical abdominal discomfort with nausea worse in the ED. Pertinent negatives include no vomiting, no fever, no blood in stool, no diarrhea.  All other systems reviewed and negative.    PAST MEDICAL HISTORY   has a past medical history of Dyslipidemia, GERD (gastroesophageal reflux disease), Hypertension, and Moderate aortic stenosis.    SURGICAL HISTORY  patient denies any surgical history    SOCIAL HISTORY  Social History     Tobacco Use   • Smoking status: Never Smoker   • Smokeless tobacco: Never Used   Vaping Use   • Vaping Use: Never used   Substance Use Topics   • Alcohol use: Not Currently     Alcohol/week: 0.6 oz     Types: 1 Standard drinks or equivalent per week      "Comment: Rare   • Drug use: No      Social History     Substance and Sexual Activity   Drug Use No       FAMILY HISTORY  Family History   Problem Relation Age of Onset   • Arthritis Mother    • Alcohol/Drug Mother    • Alcohol/Drug Father    • Hypertension Father    • Other Father 46        brain aneurysm rupture   • Heart Disease Neg Hx        CURRENT MEDICATIONS  Home Medications     Reviewed by Serena Michel R.N. (Registered Nurse) on 09/24/21 at 1728  Med List Status: Not Addressed   Medication Last Dose Status   amLODIPine (NORVASC) 2.5 MG Tab  Active   mirtazapine (REMERON) 15 MG Tab  Active   ondansetron (ZOFRAN) 4 MG Tab tablet  Active   simvastatin (ZOCOR) 10 MG Tab  Active   tamsulosin (FLOMAX) 0.4 MG capsule  Active   VITAMIN D PO  Active                ALLERGIES  No Known Allergies    PHYSICAL EXAM  VITAL SIGNS: /64   Pulse 76   Temp 36.4 °C (97.5 °F)   Resp 16   Ht 1.778 m (5' 10\")   Wt 75.4 kg (166 lb 3.6 oz)   SpO2 97%   BMI 23.85 kg/m²     General: Alert, no acute distress  Skin: Warm, dry, normal for ethnicity  Head: Normocephalic, atraumatic  Neck: Trachea midline, no tenderness  Eye: PERRL, normal conjunctiva  ENMT: Oral mucosa moist, no pharyngeal erythema or exudate  Cardiovascular: Regular rate and rhythm, grade 3/6 systolic murmur heard best over the right mild sternum, Normal peripheral perfusion  Respiratory: Lungs CTA, respirations are non-labored, breath sounds are equal  Gastrointestinal: Soft, mild periumbilical tenderness, otherwise, non distended.  Bowel sounds normal active.  No guarding, no rebound, no rigidity  Musculoskeletal: No swelling, no deformity  Neurological: Alert and oriented to person, place, time, and situation  Lymphatics: No lymphadenopathy  Psychiatric: Cooperative, appropriate mood & affect      DIAGNOSTIC STUDIES/PROCEDURES    LABS  Results for orders placed or performed during the hospital encounter of 09/24/21   CBC WITH DIFFERENTIAL   Result " Value Ref Range    WBC 5.1 4.8 - 10.8 K/uL    RBC 4.44 (L) 4.70 - 6.10 M/uL    Hemoglobin 14.1 14.0 - 18.0 g/dL    Hematocrit 42.1 42.0 - 52.0 %    MCV 94.8 81.4 - 97.8 fL    MCH 31.8 27.0 - 33.0 pg    MCHC 33.5 (L) 33.7 - 35.3 g/dL    RDW 42.5 35.9 - 50.0 fL    Platelet Count 242 164 - 446 K/uL    MPV 9.4 9.0 - 12.9 fL    Neutrophils-Polys 51.30 44.00 - 72.00 %    Lymphocytes 32.90 22.00 - 41.00 %    Monocytes 12.90 0.00 - 13.40 %    Eosinophils 1.90 0.00 - 6.90 %    Basophils 0.80 0.00 - 1.80 %    Immature Granulocytes 0.20 0.00 - 0.90 %    Nucleated RBC 0.00 /100 WBC    Neutrophils (Absolute) 2.63 1.82 - 7.42 K/uL    Lymphs (Absolute) 1.69 1.00 - 4.80 K/uL    Monos (Absolute) 0.66 0.00 - 0.85 K/uL    Eos (Absolute) 0.10 0.00 - 0.51 K/uL    Baso (Absolute) 0.04 0.00 - 0.12 K/uL    Immature Granulocytes (abs) 0.01 0.00 - 0.11 K/uL    NRBC (Absolute) 0.00 K/uL   COMP METABOLIC PANEL   Result Value Ref Range    Sodium 140 135 - 145 mmol/L    Potassium 3.8 3.6 - 5.5 mmol/L    Chloride 105 96 - 112 mmol/L    Co2 24 20 - 33 mmol/L    Anion Gap 11.0 7.0 - 16.0    Glucose 88 65 - 99 mg/dL    Bun 17 8 - 22 mg/dL    Creatinine 1.10 0.50 - 1.40 mg/dL    Calcium 9.3 8.4 - 10.2 mg/dL    AST(SGOT) 18 12 - 45 U/L    ALT(SGPT) 13 2 - 50 U/L    Alkaline Phosphatase 58 30 - 99 U/L    Total Bilirubin 0.5 0.1 - 1.5 mg/dL    Albumin 4.3 3.2 - 4.9 g/dL    Total Protein 7.0 6.0 - 8.2 g/dL    Globulin 2.7 1.9 - 3.5 g/dL    A-G Ratio 1.6 g/dL   LIPASE   Result Value Ref Range    Lipase 25 7 - 58 U/L   URINALYSIS    Specimen: Urine   Result Value Ref Range    Color Yellow     Character Clear     Specific Gravity 1.010 <1.035    Ph 5.5 5.0 - 8.0    Glucose Negative Negative mg/dL    Ketones Negative Negative mg/dL    Protein Negative Negative mg/dL    Bilirubin Negative Negative    Nitrite Negative Negative    Leukocyte Esterase Negative Negative    Occult Blood Trace (A) Negative    Micro Urine Req Microscopic    URINE MICROSCOPIC (W/UA)  "  Result Value Ref Range    WBC Rare (A) /hpf    RBC Rare /hpf    Bacteria Rare (A) None /hpf    Mucous Threads Rare /hpf   ESTIMATED GFR   Result Value Ref Range    GFR If African American >60 >60 mL/min/1.73 m 2    GFR If Non African American >60 >60 mL/min/1.73 m 2     All labs reviewed by me.      RADIOLOGY  Reviewed unremarkable CTA of chest abdomen and pelvis dated yesterday the 23rd.    The radiologist's interpretation of all radiological studies have been reviewed by me.    COURSE & MEDICAL DECISION MAKING  Pertinent Labs & Imaging studies reviewed. (See chart for details)    6:29 PM - Patient seen and examined at bedside. Patient declines analgesia at this time. Ordered metabolic work-up including lipase to evaluate his symptoms. The differential diagnoses include but are not limited to: Diverticulitis, diverticulosis, gastritis, peptic ulcer disease, gastroenteritis    1850: Patient reassessed, I have reviewed the CT and labs, they are thankfully unremarkable and there is no evidence of diverticulitis or other infectious process nor perforation or abscess with regard to the abdominal imaging.    Patient Vitals for the past 24 hrs:   BP Temp Temp src Pulse Resp SpO2 Height Weight   09/24/21 1836 136/64 36.4 °C (97.5 °F) -- 76 16 97 % -- --   09/24/21 1726 144/75 36.3 °C (97.4 °F) Temporal 83 16 98 % 1.778 m (5' 10\") 75.4 kg (166 lb 3.6 oz)       Decision Making:  This is a 65 y.o. year old male who presents with abdominal discomfort rather chronically but more so for the last 3 to 4 days with associated nausea.  He had CT imaging done to evaluate his aortic stenosis that did image the abdomen and pelvis and no diverticulosis.  Apparently was told to come to the emergency department for evaluation of potential diverticulitis because of this finding and his pain.  Patient does not have focal lower quadrant tenderness, no leukocytosis, no fever.  Given imaging done yesterday there is no indication to repeat " CT.  This is clearly not diverticulitis.  I suspect perhaps gastritis, patient is currently not taking a PPI at all but tells me he will be starting Pepcid 20 mg daily.  I will order Carafate for him as well.  No indication for antibiotics for surgical intervention.    The patient will return for new or worsening symptoms and is stable at the time of discharge.    Patient has had high blood pressure while in the emergency department, felt likely secondary to medical condition. Counseled patient to monitor blood pressure at home and follow up with primary care physician.      DISPOSITION:  Patient will be discharged home in stable condition.    FOLLOW UP:  Mac Gentile M.D.  [address]  [address]  [place] [address]  [phone]    Schedule an appointment as soon as possible for a visit       [facility]  [address]  [place] [address]  [phone]  Schedule an appointment as soon as possible for a visit         OUTPATIENT MEDICATIONS:  Discharge Medication List as of 9/24/2021  6:46 PM      START taking these medications    Details   sucralfate (CARAFATE) 1 GM/10ML Suspension Take 10 mL by mouth 4 times a day for 14 days., Disp-560 mL, R-0, Normal               FINAL IMPRESSION  1. Diverticulosis of colon without diverticulitis    2. Periumbilical abdominal pain          Jaylan CASTILLO M.D. (Roberto), am scribing for, and in the presence of, Jaylan Alejandro MD.    Electronically signed by: Jaylan Alejandro M.D. (Roberto), 9/24/2021    Jaylan CASTILLO MD personally performed the services described in this documentation, as scribed by Jaylan Alejandro M.D. in my presence, and it is both accurate and complete    The note accurately reflects work and decisions made by me.  Jaylan Alejandro M.D.  9/25/2021  12:40 AM

## 2021-09-26 PROCEDURE — 93228 REMOTE 30 DAY ECG REV/REPORT: CPT | Performed by: INTERNAL MEDICINE

## 2021-09-27 ENCOUNTER — PRE-ADMISSION TESTING (OUTPATIENT)
Dept: ADMISSIONS | Facility: MEDICAL CENTER | Age: 66
End: 2021-09-27
Attending: INTERNAL MEDICINE
Payer: MEDICARE

## 2021-09-27 DIAGNOSIS — Z01.810 PRE-OPERATIVE CARDIOVASCULAR EXAMINATION: ICD-10-CM

## 2021-09-27 DIAGNOSIS — Z01.812 PRE-OPERATIVE LABORATORY EXAMINATION: ICD-10-CM

## 2021-09-27 LAB
ABO GROUP BLD: NORMAL
ALBUMIN SERPL BCP-MCNC: 4.8 G/DL (ref 3.2–4.9)
ALBUMIN/GLOB SERPL: 1.8 G/DL
ALP SERPL-CCNC: 56 U/L (ref 30–99)
ALT SERPL-CCNC: 7 U/L (ref 2–50)
ANION GAP SERPL CALC-SCNC: 11 MMOL/L (ref 7–16)
APTT PPP: 32.1 SEC (ref 24.7–36)
AST SERPL-CCNC: 11 U/L (ref 12–45)
BASOPHILS # BLD AUTO: 0.7 % (ref 0–1.8)
BASOPHILS # BLD: 0.04 K/UL (ref 0–0.12)
BILIRUB SERPL-MCNC: 0.7 MG/DL (ref 0.1–1.5)
BLD GP AB SCN SERPL QL: NORMAL
BUN SERPL-MCNC: 19 MG/DL (ref 8–22)
CALCIUM SERPL-MCNC: 9.6 MG/DL (ref 8.5–10.5)
CHLORIDE SERPL-SCNC: 104 MMOL/L (ref 96–112)
CO2 SERPL-SCNC: 25 MMOL/L (ref 20–33)
CREAT SERPL-MCNC: 1.13 MG/DL (ref 0.5–1.4)
EKG IMPRESSION: NORMAL
EOSINOPHIL # BLD AUTO: 0.06 K/UL (ref 0–0.51)
EOSINOPHIL NFR BLD: 1.1 % (ref 0–6.9)
ERYTHROCYTE [DISTWIDTH] IN BLOOD BY AUTOMATED COUNT: 42.9 FL (ref 35.9–50)
GLOBULIN SER CALC-MCNC: 2.7 G/DL (ref 1.9–3.5)
GLUCOSE SERPL-MCNC: 122 MG/DL (ref 65–99)
HCT VFR BLD AUTO: 43.3 % (ref 42–52)
HGB BLD-MCNC: 14.4 G/DL (ref 14–18)
IMM GRANULOCYTES # BLD AUTO: 0.02 K/UL (ref 0–0.11)
IMM GRANULOCYTES NFR BLD AUTO: 0.4 % (ref 0–0.9)
INR PPP: 1.06 (ref 0.87–1.13)
LYMPHOCYTES # BLD AUTO: 0.94 K/UL (ref 1–4.8)
LYMPHOCYTES NFR BLD: 16.9 % (ref 22–41)
MCH RBC QN AUTO: 31.4 PG (ref 27–33)
MCHC RBC AUTO-ENTMCNC: 33.3 G/DL (ref 33.7–35.3)
MCV RBC AUTO: 94.3 FL (ref 81.4–97.8)
MONOCYTES # BLD AUTO: 0.63 K/UL (ref 0–0.85)
MONOCYTES NFR BLD AUTO: 11.3 % (ref 0–13.4)
NEUTROPHILS # BLD AUTO: 3.88 K/UL (ref 1.82–7.42)
NEUTROPHILS NFR BLD: 69.6 % (ref 44–72)
NRBC # BLD AUTO: 0 K/UL
NRBC BLD-RTO: 0 /100 WBC
NT-PROBNP SERPL IA-MCNC: 35 PG/ML (ref 0–125)
PLATELET # BLD AUTO: 244 K/UL (ref 164–446)
PMV BLD AUTO: 9.8 FL (ref 9–12.9)
POTASSIUM SERPL-SCNC: 4.6 MMOL/L (ref 3.6–5.5)
PROT SERPL-MCNC: 7.5 G/DL (ref 6–8.2)
PROTHROMBIN TIME: 13.5 SEC (ref 12–14.6)
RBC # BLD AUTO: 4.59 M/UL (ref 4.7–6.1)
RH BLD: NORMAL
SARS-COV-2 RNA RESP QL NAA+PROBE: NOTDETECTED
SODIUM SERPL-SCNC: 140 MMOL/L (ref 135–145)
SPECIMEN SOURCE: NORMAL
WBC # BLD AUTO: 5.6 K/UL (ref 4.8–10.8)

## 2021-09-27 PROCEDURE — 86850 RBC ANTIBODY SCREEN: CPT

## 2021-09-27 PROCEDURE — 85610 PROTHROMBIN TIME: CPT

## 2021-09-27 PROCEDURE — C9803 HOPD COVID-19 SPEC COLLECT: HCPCS

## 2021-09-27 PROCEDURE — 80053 COMPREHEN METABOLIC PANEL: CPT

## 2021-09-27 PROCEDURE — 36415 COLL VENOUS BLD VENIPUNCTURE: CPT

## 2021-09-27 PROCEDURE — 86901 BLOOD TYPING SEROLOGIC RH(D): CPT

## 2021-09-27 PROCEDURE — 85730 THROMBOPLASTIN TIME PARTIAL: CPT

## 2021-09-27 PROCEDURE — 93010 ELECTROCARDIOGRAM REPORT: CPT | Performed by: INTERNAL MEDICINE

## 2021-09-27 PROCEDURE — 86900 BLOOD TYPING SEROLOGIC ABO: CPT

## 2021-09-27 PROCEDURE — U0005 INFEC AGEN DETEC AMPLI PROBE: HCPCS

## 2021-09-27 PROCEDURE — 83880 ASSAY OF NATRIURETIC PEPTIDE: CPT

## 2021-09-27 PROCEDURE — U0003 INFECTIOUS AGENT DETECTION BY NUCLEIC ACID (DNA OR RNA); SEVERE ACUTE RESPIRATORY SYNDROME CORONAVIRUS 2 (SARS-COV-2) (CORONAVIRUS DISEASE [COVID-19]), AMPLIFIED PROBE TECHNIQUE, MAKING USE OF HIGH THROUGHPUT TECHNOLOGIES AS DESCRIBED BY CMS-2020-01-R: HCPCS

## 2021-09-27 PROCEDURE — 85025 COMPLETE CBC W/AUTO DIFF WBC: CPT

## 2021-09-27 PROCEDURE — 93005 ELECTROCARDIOGRAM TRACING: CPT

## 2021-09-27 RX ORDER — FAMOTIDINE 20 MG/1
20 TABLET, FILM COATED ORAL 2 TIMES DAILY
COMMUNITY
End: 2021-10-08

## 2021-09-27 ASSESSMENT — FIBROSIS 4 INDEX
FIB4 SCORE: 1.38
FIB4 SCORE: 1.38

## 2021-09-29 ENCOUNTER — APPOINTMENT (OUTPATIENT)
Dept: CARDIOLOGY | Facility: MEDICAL CENTER | Age: 66
End: 2021-09-29
Attending: INTERNAL MEDICINE
Payer: MEDICARE

## 2021-09-29 ENCOUNTER — HOSPITAL ENCOUNTER (OUTPATIENT)
Facility: MEDICAL CENTER | Age: 66
End: 2021-09-29
Attending: INTERNAL MEDICINE | Admitting: INTERNAL MEDICINE
Payer: MEDICARE

## 2021-09-29 VITALS
BODY MASS INDEX: 23.42 KG/M2 | OXYGEN SATURATION: 96 % | WEIGHT: 163.58 LBS | DIASTOLIC BLOOD PRESSURE: 68 MMHG | SYSTOLIC BLOOD PRESSURE: 138 MMHG | HEIGHT: 70 IN | HEART RATE: 65 BPM | RESPIRATION RATE: 18 BRPM | TEMPERATURE: 97.4 F

## 2021-09-29 DIAGNOSIS — I35.0 SEVERE AORTIC STENOSIS: ICD-10-CM

## 2021-09-29 PROCEDURE — 99152 MOD SED SAME PHYS/QHP 5/>YRS: CPT

## 2021-09-29 PROCEDURE — 700111 HCHG RX REV CODE 636 W/ 250 OVERRIDE (IP)

## 2021-09-29 PROCEDURE — 700117 HCHG RX CONTRAST REV CODE 255: Performed by: INTERNAL MEDICINE

## 2021-09-29 PROCEDURE — 160002 HCHG RECOVERY MINUTES (STAT)

## 2021-09-29 PROCEDURE — 700101 HCHG RX REV CODE 250

## 2021-09-29 RX ORDER — HEPARIN SODIUM 200 [USP'U]/100ML
INJECTION, SOLUTION INTRAVENOUS
Status: COMPLETED
Start: 2021-09-29 | End: 2021-09-29

## 2021-09-29 RX ORDER — VERAPAMIL HYDROCHLORIDE 2.5 MG/ML
INJECTION, SOLUTION INTRAVENOUS
Status: COMPLETED
Start: 2021-09-29 | End: 2021-09-29

## 2021-09-29 RX ORDER — MIDAZOLAM HYDROCHLORIDE 1 MG/ML
INJECTION INTRAMUSCULAR; INTRAVENOUS
Status: COMPLETED
Start: 2021-09-29 | End: 2021-09-29

## 2021-09-29 RX ORDER — HEPARIN SODIUM 1000 [USP'U]/ML
INJECTION, SOLUTION INTRAVENOUS; SUBCUTANEOUS
Status: COMPLETED
Start: 2021-09-29 | End: 2021-09-29

## 2021-09-29 RX ORDER — LIDOCAINE HYDROCHLORIDE 20 MG/ML
INJECTION, SOLUTION INFILTRATION; PERINEURAL
Status: COMPLETED
Start: 2021-09-29 | End: 2021-09-29

## 2021-09-29 RX ADMIN — IOHEXOL 14 ML: 350 INJECTION, SOLUTION INTRAVENOUS at 13:02

## 2021-09-29 RX ADMIN — FENTANYL CITRATE 100 MCG: 50 INJECTION, SOLUTION INTRAMUSCULAR; INTRAVENOUS at 12:55

## 2021-09-29 RX ADMIN — NITROGLYCERIN 10 ML: 20 INJECTION INTRAVENOUS at 12:55

## 2021-09-29 RX ADMIN — HEPARIN SODIUM: 200 INJECTION, SOLUTION INTRAVENOUS at 12:30

## 2021-09-29 RX ADMIN — HEPARIN SODIUM: 1000 INJECTION, SOLUTION INTRAVENOUS; SUBCUTANEOUS at 12:55

## 2021-09-29 RX ADMIN — VERAPAMIL HYDROCHLORIDE 2.5 MG: 2.5 INJECTION, SOLUTION INTRAVENOUS at 12:55

## 2021-09-29 RX ADMIN — MIDAZOLAM HYDROCHLORIDE 2 MG: 1 INJECTION, SOLUTION INTRAMUSCULAR; INTRAVENOUS at 12:55

## 2021-09-29 RX ADMIN — LIDOCAINE HYDROCHLORIDE: 20 INJECTION, SOLUTION INFILTRATION; PERINEURAL at 12:55

## 2021-09-29 ASSESSMENT — FIBROSIS 4 INDEX: FIB4 SCORE: 1.14

## 2021-09-29 NOTE — OR NURSING
1312 Patient arrived to PACU.  Awake and alert.  Right radial TR band site is clean, dry and soft.  Patient denies pain or numbness in right hand.  Educated on wrist precautions and plan of care.  1320 Daughter Meesha called and updated on patient status.  1400 2 ml air removed from TR band, no bleeding to site.  1412 2 ml air removed from TR band, no bleeding to site.  1422 3 ml air removed from TR band, bleeding noted.  # ml replaced, 1 ml removed.  No bleeding noted.  1445 2 ml air removed from TR band, no bleeding to site.  1500 2 ml air removed from TR band, no new bleeding to site.  1510 Final 2 ml air removed from TR band, no new bleeding to site.  1515 TR band removed, dressing placed.  1519 Report to José Luis GOMEZ.  1520 Patient transferred to Phase 2.

## 2021-09-29 NOTE — OR NURSING
1529: Patient arrived from PACU to Phase II with RN. Right radial access site; clean, dry, and soft. Patient is alert and awake. Updated on POC. Patient tolerating PO intake.  1555: Criteria met to discharge patient. Discharge paperwork reviewed with patient. Discussed activity, site care, worsening symptoms, and follow-up. Verbalized understanding. No further questions. PIV removed, tip intact.  1620: Patient escorted out in wheelchair with RN. Discharge instructions and personal belongings in possession of the patient. Copy of discharge instructions signed and placed in the chart. Patient discharged to home with daughter.

## 2021-09-29 NOTE — DISCHARGE INSTRUCTIONS
ACTIVITY: Rest and take it easy for the first 24 hours.  A responsible adult is recommended to remain with you during that time.  It is normal to feel sleepy.  We encourage you to not do anything that requires balance, judgment or coordination.    MILD FLU-LIKE SYMPTOMS ARE NORMAL. YOU MAY EXPERIENCE GENERALIZED MUSCLE ACHES, THROAT IRRITATION, HEADACHE AND/OR SOME NAUSEA.    FOR 24 HOURS DO NOT:  Drive, operate machinery or run household appliances.  Drink beer or alcoholic beverages.   Make important decisions or sign legal documents.    SPECIAL INSTRUCTIONS:     POST ANGIOGRAM  General Care Instructions  • Maintain a bandage over the incision site for 24 hours.  It's normal to find a small bruise or dime-sized lump at the insertion site. This should disappear within a few weeks.  • Do not apply lotions or powders to the site.  Do not immerse the catheter insertion site in water (bathtub/swimming) for five days. It is ok to shower 24 hours after the procedure.  • You may resume your normal diet immediately; on the day of your procedure, drink 6-10 glasses of water to help flush the contrast liquid out of your system.  • If the doctor inserted the catheter in through your groin:  o Walking short distances on a flat surface is OK. Limit going up/down stairs for the first 2 days.  o DO NOT do yard work, drive, squat, lift heavy objects, or play sports for 2 days; or until your health care provider tells you it is OK.  • If the doctor inserted the catheter in your arm:  o For 24 hours, DO NOT lift anything heavier than 10 pounds (approximately a gallon of milk). DO NOT do any heavy pushing, pulling, or twisting.    Medications  • If your current medications need to be changed, you will be provided with an updated list of your medications prior to discharge.  • If you take warfarin (Coumadin), resume taking your usual dose the evening after the procedure.  • DO NOT STOP taking prescribed blood thinning  (anti-platelet) medications unless instructed by your cardiologist.  These medications include:  o Aspirin, Clopidogrel (Plavix), Ticagrelor (Brilinta), or Prasugrel (Effient)   • If you take one of the following anticoagulants, RESUME 24 HOURS after your procedure:  o Apixiban (Eliquis), Rivaroxaban (Xarelto), Dabigatran (Pradaxa), Edoxaban (Savaysa)  • If you take metformin (Glucophage), RESUME 48 HOURS after your procedure.    When to call your healthcare provider  Call your cardiologist right away at [phone] if you have any of the following:  ?  Problems/Concerns taking any of your prescribed heart medicines.  ?  The insertion site has increasing pain, swelling, redness, bleeding, or drainage.  ?  Your arm or leg below where the insertion site changes color, is cool, or is numb.  ?  You have chest pain or shortness of breath that does not go away with rest.  ?  Your pulse feels irregular -- very slow (less than 60 beats/minute) or very fast (over 100 beats/minute).  ?  You have dizziness, fainting, or you are very tired.  ?  You are coughing up blood or yellow or green mucus.  ?  You have chills or a fever over 101°F (38.3°C).   ?  If there is bleeding at the catheter insertion site, apply pressure for 10 minutes.  If bleeding persists, call 911, and continue to hold pressure until advanced medical support arrives.    DIET: To avoid nausea, slowly advance diet as tolerated, avoiding spicy or greasy foods for the first day.  Add more substantial food to your diet according to your physician's instructions.  Babies can be fed formula or breast milk as soon as they are hungry.  INCREASE FLUIDS AND FIBER TO AVOID CONSTIPATION.    SURGICAL DRESSING/BATHING: See above instructions.    FOLLOW-UP APPOINTMENT:  A follow-up appointment should be arranged with your doctor; call to schedule.    You should CALL YOUR PHYSICIAN if you develop:  Fever greater than 101 degrees F.  Pain not relieved by medication, or  persistent nausea or vomiting.  Excessive bleeding (blood soaking through dressing) or unexpected drainage from the wound.  Extreme redness or swelling around the incision site, drainage of pus or foul smelling drainage.  Inability to urinate or empty your bladder within 8 hours.  Problems with breathing or chest pain.    You should call 911 if you develop problems with breathing or chest pain.  If you are unable to contact your doctor or surgical center, you should go to the nearest emergency room or urgent care center.  Physician's telephone #: [phone]    If any questions arise, call your doctor.  If your doctor is not available, please feel free to call the Surgical Center at [phone]. The Contact Center is open Monday through Friday 7AM to 5PM and may speak to a nurse at [phone], or toll free at [phone].     A registered nurse may call you a few days after your surgery to see how you are doing after your procedure.    MEDICATIONS: Resume taking daily medication.  Take prescribed pain medication with food.  If no medication is prescribed, you may take non-aspirin pain medication if needed.  PAIN MEDICATION CAN BE VERY CONSTIPATING.  Take a stool softener or laxative such as senokot, pericolace, or milk of magnesia if needed.    If your physician has prescribed pain medication that includes Acetaminophen (Tylenol), do not take additional Acetaminophen (Tylenol) while taking the prescribed medication.    Depression / Suicide Risk    As you are discharged from this [facility] facility, it is important to learn how to keep safe from harming yourself.    Recognize the warning signs:  · Abrupt changes in personality, positive or negative- including increase in energy   · Giving away possessions  · Change in eating patterns- significant weight changes-  positive or negative  · Change in sleeping patterns- unable to sleep or sleeping all the time   · Unwillingness or inability to  communicate  · Depression  · Unusual sadness, discouragement and loneliness  · Talk of wanting to die  · Neglect of personal appearance   · Rebelliousness- reckless behavior  · Withdrawal from people/activities they love  · Confusion- inability to concentrate     If you or a loved one observes any of these behaviors or has concerns about self-harm, here's what you can do:  · Talk about it- your feelings and reasons for harming yourself  · Remove any means that you might use to hurt yourself (examples: pills, rope, extension cords, firearm)  · Get professional help from the community (Mental Health, Substance Abuse, psychological counseling)  · Do not be alone:Call your Safe Contact- someone whom you trust who will be there for you.  · Call your local CRISIS HOTLINE 569-2531 or 057-749-5508  · Call your local Children's Mobile Crisis Response Team [place] (699) 335-4420 or www.TheGrid  · Call the toll free National Suicide Prevention Hotlines   · National Suicide Prevention Lifeline 109-626-HKLW (6624)  · National Hope Line Network 800-SUICIDE (545-2316)

## 2021-09-29 NOTE — PROCEDURES
Cardiac Catheterization report    9/29/2021  1:06 PM      Primary Care Provider: Mac Gentile M.D.    Indication for procedure: Severe valvular heart disease    Procedures performed:  ·  Coronary arteriograms     Final impression:  Angiographically normal appearing coronary arteries.      Findings:  1.  Left main coronary artery:  Normal.  2.  Left anterior descending artery:  Normal.   3.  Left circumflex coronary artery:  Normal.   4.  Right coronary artery:  Normal.  This is a right dominant system.    EBL: <10 CC    Specimens: None    Procedure details:  The risks and benefits of cardiac catheterization and possible intervention were explained to the patient including death, heart attack, stroke, and emergency surgery.  The patient verbalized understanding and wished to proceed.  The patient was brought to the cardiac catheterization laboratory in the fasting state and prepped and draped in the usual sterile fashion.  The right wrist was locally anesthetized with lidocaine and the right radial artery was cannulated with 5/6-Pakistani equipment and standard radial cocktail was given.  Coronary angiography was performed using JR 4 and JL 3.5  diagnostic catheters in the usual fashion, results mentioned below.    Once all the views were obtained, all wires and catheters were removed from the patient without difficulty.  A Vasc-Band was placed over the right radial artery and the radial artery sheath was removed without difficulty.      Complications:  None    Sedation time:  I supervised moderate sedation over a trained independent nursing staff,  Sedation Start time:  12:42   Sedation Stop time: 12:57      ZACHERY Covarrubias  [facility] Division of heart and vascular health       No

## 2021-09-30 ENCOUNTER — DOCUMENTATION (OUTPATIENT)
Dept: CARDIOLOGY | Facility: MEDICAL CENTER | Age: 66
End: 2021-09-30

## 2021-09-30 NOTE — PROGRESS NOTES
Valve Conference Heart Team Plan of Care: 9/29/21    Valve candidate: yes  With Possible open heart: no  Access: left iliac  Valve Size: 29 S3 ultra  Cerebral Protection: none  Anesthesia: MAC  Unit: TELE  Incidental findings: none   Clearance needed prior to procedure: none      Plan of care: proceed with TAVR as planned 10/4/21.      Spoke with patient and daughter to update with heart team's recommendations. Reviewed pre-operative instructions including NPO midnight prior, no Rx AM of procedure, check in at [facility] surgery check in 0530 on 10/4/21. Patient and daughterstates understanding of all information, agrees with the plan, and states no further questions at this time.

## 2021-10-01 ENCOUNTER — DOCUMENTATION (OUTPATIENT)
Dept: CARDIOLOGY | Facility: MEDICAL CENTER | Age: 66
End: 2021-10-01

## 2021-10-01 NOTE — PROGRESS NOTES
Valve Program Functional Assessment: PRE-OP TAVR    KCCQ12   1a) Showering/bathin  1b) Walking 1 block on ground: 5  1c) Hurrying or jogging:3  2) Swellin  3) Fatigue: 4  4) Shortness of breath: 5  5) Sleep sitting up: 5  6) Limited enjoyment of life: 3  7) Spend the rest of your life with HF: 2  8a) Hobbies, recreational activities:3  8b) Working or doing household chores:3  8c) Visiting family or friends: 2       Strength   1) _38_____ kg  2) _39_____ kg  3) _38_____ kg    VALLE ADLs  Patient independently preforms...   - Bathing: Yes   - Dressing: Yes   - Toileting: Yes   - Transferring: Yes   - Continence: Yes   - Feeding: Yes     Living Situation  Patient lives: alone    Mobility Aids   Patient uses: none

## 2021-10-04 ENCOUNTER — ANESTHESIA (OUTPATIENT)
Dept: SURGERY | Facility: MEDICAL CENTER | Age: 66
DRG: 267 | End: 2021-10-04
Payer: MEDICARE

## 2021-10-04 ENCOUNTER — APPOINTMENT (OUTPATIENT)
Dept: CARDIOLOGY | Facility: MEDICAL CENTER | Age: 66
DRG: 267 | End: 2021-10-04
Attending: ANESTHESIOLOGY
Payer: MEDICARE

## 2021-10-04 ENCOUNTER — ANESTHESIA EVENT (OUTPATIENT)
Dept: SURGERY | Facility: MEDICAL CENTER | Age: 66
DRG: 267 | End: 2021-10-04
Payer: MEDICARE

## 2021-10-04 ENCOUNTER — HOSPITAL ENCOUNTER (INPATIENT)
Facility: MEDICAL CENTER | Age: 66
LOS: 1 days | DRG: 267 | End: 2021-10-05
Attending: INTERNAL MEDICINE | Admitting: INTERNAL MEDICINE
Payer: MEDICARE

## 2021-10-04 ENCOUNTER — APPOINTMENT (OUTPATIENT)
Dept: RADIOLOGY | Facility: MEDICAL CENTER | Age: 66
DRG: 267 | End: 2021-10-04
Attending: NURSE PRACTITIONER
Payer: MEDICARE

## 2021-10-04 DIAGNOSIS — I35.0 SEVERE AORTIC STENOSIS: ICD-10-CM

## 2021-10-04 DIAGNOSIS — N40.0 ENLARGED PROSTATE: ICD-10-CM

## 2021-10-04 DIAGNOSIS — F41.9 ANXIETY: ICD-10-CM

## 2021-10-04 DIAGNOSIS — I10 ESSENTIAL HYPERTENSION: ICD-10-CM

## 2021-10-04 DIAGNOSIS — E78.5 DYSLIPIDEMIA: ICD-10-CM

## 2021-10-04 PROBLEM — R10.9 ABDOMINAL DISCOMFORT: Status: RESOLVED | Noted: 2021-01-28 | Resolved: 2021-10-04

## 2021-10-04 PROBLEM — Z00.00 HEALTHCARE MAINTENANCE: Status: RESOLVED | Noted: 2018-11-05 | Resolved: 2021-10-04

## 2021-10-04 PROBLEM — R23.2 FLUSHING: Status: RESOLVED | Noted: 2021-08-20 | Resolved: 2021-10-04

## 2021-10-04 PROBLEM — Z95.2 S/P TAVR (TRANSCATHETER AORTIC VALVE REPLACEMENT): Status: ACTIVE | Noted: 2018-12-18

## 2021-10-04 LAB
ABO + RH BLD: NORMAL
ALBUMIN SERPL BCP-MCNC: 4.1 G/DL (ref 3.2–4.9)
ALBUMIN/GLOB SERPL: 1.6 G/DL
ALP SERPL-CCNC: 53 U/L (ref 30–99)
ALT SERPL-CCNC: 13 U/L (ref 2–50)
ANION GAP SERPL CALC-SCNC: 9 MMOL/L (ref 7–16)
AST SERPL-CCNC: 22 U/L (ref 12–45)
BILIRUB SERPL-MCNC: 0.4 MG/DL (ref 0.1–1.5)
BUN SERPL-MCNC: 17 MG/DL (ref 8–22)
CALCIUM SERPL-MCNC: 9 MG/DL (ref 8.5–10.5)
CHLORIDE SERPL-SCNC: 105 MMOL/L (ref 96–112)
CO2 SERPL-SCNC: 27 MMOL/L (ref 20–33)
CREAT SERPL-MCNC: 1.11 MG/DL (ref 0.5–1.4)
EKG IMPRESSION: NORMAL
ERYTHROCYTE [DISTWIDTH] IN BLOOD BY AUTOMATED COUNT: 43.3 FL (ref 35.9–50)
GLOBULIN SER CALC-MCNC: 2.5 G/DL (ref 1.9–3.5)
GLUCOSE SERPL-MCNC: 105 MG/DL (ref 65–99)
HCT VFR BLD AUTO: 41.3 % (ref 42–52)
HGB BLD-MCNC: 13.5 G/DL (ref 14–18)
MCH RBC QN AUTO: 31.1 PG (ref 27–33)
MCHC RBC AUTO-ENTMCNC: 32.7 G/DL (ref 33.7–35.3)
MCV RBC AUTO: 95.2 FL (ref 81.4–97.8)
NT-PROBNP SERPL IA-MCNC: 54 PG/ML (ref 0–125)
PLATELET # BLD AUTO: 197 K/UL (ref 164–446)
PMV BLD AUTO: 9.9 FL (ref 9–12.9)
POTASSIUM SERPL-SCNC: 4.7 MMOL/L (ref 3.6–5.5)
PROT SERPL-MCNC: 6.6 G/DL (ref 6–8.2)
RBC # BLD AUTO: 4.34 M/UL (ref 4.7–6.1)
SODIUM SERPL-SCNC: 141 MMOL/L (ref 135–145)
WBC # BLD AUTO: 7.4 K/UL (ref 4.8–10.8)

## 2021-10-04 PROCEDURE — 93010 ELECTROCARDIOGRAM REPORT: CPT | Performed by: INTERNAL MEDICINE

## 2021-10-04 PROCEDURE — 85014 HEMATOCRIT: CPT

## 2021-10-04 PROCEDURE — A9270 NON-COVERED ITEM OR SERVICE: HCPCS | Performed by: NURSE PRACTITIONER

## 2021-10-04 PROCEDURE — C1894 INTRO/SHEATH, NON-LASER: HCPCS | Performed by: INTERNAL MEDICINE

## 2021-10-04 PROCEDURE — 93325 DOPPLER ECHO COLOR FLOW MAPG: CPT

## 2021-10-04 PROCEDURE — 71045 X-RAY EXAM CHEST 1 VIEW: CPT

## 2021-10-04 PROCEDURE — 02RF38Z REPLACEMENT OF AORTIC VALVE WITH ZOOPLASTIC TISSUE, PERCUTANEOUS APPROACH: ICD-10-PCS | Performed by: THORACIC SURGERY (CARDIOTHORACIC VASCULAR SURGERY)

## 2021-10-04 PROCEDURE — 84295 ASSAY OF SERUM SODIUM: CPT

## 2021-10-04 PROCEDURE — 503000 HCHG SUTURE, OHS: Performed by: INTERNAL MEDICINE

## 2021-10-04 PROCEDURE — 502240 HCHG MISC OR SUPPLY RC 0272: Performed by: INTERNAL MEDICINE

## 2021-10-04 PROCEDURE — 110372 HCHG SHELL REV 278: Performed by: INTERNAL MEDICINE

## 2021-10-04 PROCEDURE — 700111 HCHG RX REV CODE 636 W/ 250 OVERRIDE (IP): Performed by: INTERNAL MEDICINE

## 2021-10-04 PROCEDURE — 36415 COLL VENOUS BLD VENIPUNCTURE: CPT

## 2021-10-04 PROCEDURE — 82803 BLOOD GASES ANY COMBINATION: CPT

## 2021-10-04 PROCEDURE — 700111 HCHG RX REV CODE 636 W/ 250 OVERRIDE (IP): Performed by: ANESTHESIOLOGY

## 2021-10-04 PROCEDURE — 33361 REPLACE AORTIC VALVE PERQ: CPT | Mod: 62,Q0 | Performed by: THORACIC SURGERY (CARDIOTHORACIC VASCULAR SURGERY)

## 2021-10-04 PROCEDURE — 160042 HCHG SURGERY MINUTES - EA ADDL 1 MIN LEVEL 5: Performed by: INTERNAL MEDICINE

## 2021-10-04 PROCEDURE — 501838 HCHG SUTURE GENERAL: Performed by: INTERNAL MEDICINE

## 2021-10-04 PROCEDURE — 85347 COAGULATION TIME ACTIVATED: CPT

## 2021-10-04 PROCEDURE — 85027 COMPLETE CBC AUTOMATED: CPT

## 2021-10-04 PROCEDURE — 500002 HCHG ADHESIVE, DERMABOND: Performed by: INTERNAL MEDICINE

## 2021-10-04 PROCEDURE — 700117 HCHG RX CONTRAST REV CODE 255: Performed by: INTERNAL MEDICINE

## 2021-10-04 PROCEDURE — 501589 HCHG TUBE, CHEST 28FR: Performed by: INTERNAL MEDICINE

## 2021-10-04 PROCEDURE — 501745 HCHG WIRE, SURGICAL STEEL: Performed by: INTERNAL MEDICINE

## 2021-10-04 PROCEDURE — 700105 HCHG RX REV CODE 258: Performed by: INTERNAL MEDICINE

## 2021-10-04 PROCEDURE — 700102 HCHG RX REV CODE 250 W/ 637 OVERRIDE(OP): Performed by: NURSE PRACTITIONER

## 2021-10-04 PROCEDURE — 700105 HCHG RX REV CODE 258: Performed by: NURSE PRACTITIONER

## 2021-10-04 PROCEDURE — C1725 CATH, TRANSLUMIN NON-LASER: HCPCS | Performed by: INTERNAL MEDICINE

## 2021-10-04 PROCEDURE — 160048 HCHG OR STATISTICAL LEVEL 1-5: Performed by: INTERNAL MEDICINE

## 2021-10-04 PROCEDURE — 160009 HCHG ANES TIME/MIN: Performed by: INTERNAL MEDICINE

## 2021-10-04 PROCEDURE — 770020 HCHG ROOM/CARE - TELE (206)

## 2021-10-04 PROCEDURE — 700111 HCHG RX REV CODE 636 W/ 250 OVERRIDE (IP): Performed by: NURSE PRACTITIONER

## 2021-10-04 PROCEDURE — 160002 HCHG RECOVERY MINUTES (STAT): Performed by: INTERNAL MEDICINE

## 2021-10-04 PROCEDURE — 82330 ASSAY OF CALCIUM: CPT

## 2021-10-04 PROCEDURE — C1883 ADAPT/EXT, PACING/NEURO LEAD: HCPCS | Performed by: INTERNAL MEDICINE

## 2021-10-04 PROCEDURE — C1769 GUIDE WIRE: HCPCS | Performed by: INTERNAL MEDICINE

## 2021-10-04 PROCEDURE — 160036 HCHG PACU - EA ADDL 30 MINS PHASE I: Performed by: INTERNAL MEDICINE

## 2021-10-04 PROCEDURE — A6402 STERILE GAUZE <= 16 SQ IN: HCPCS | Performed by: INTERNAL MEDICINE

## 2021-10-04 PROCEDURE — 700101 HCHG RX REV CODE 250: Performed by: ANESTHESIOLOGY

## 2021-10-04 PROCEDURE — 93005 ELECTROCARDIOGRAM TRACING: CPT | Performed by: NURSE PRACTITIONER

## 2021-10-04 PROCEDURE — 84132 ASSAY OF SERUM POTASSIUM: CPT

## 2021-10-04 PROCEDURE — 83880 ASSAY OF NATRIURETIC PEPTIDE: CPT

## 2021-10-04 PROCEDURE — 33361 REPLACE AORTIC VALVE PERQ: CPT | Mod: 62,Q0 | Performed by: INTERNAL MEDICINE

## 2021-10-04 PROCEDURE — 80053 COMPREHEN METABOLIC PANEL: CPT

## 2021-10-04 PROCEDURE — 160031 HCHG SURGERY MINUTES - 1ST 30 MINS LEVEL 5: Performed by: INTERNAL MEDICINE

## 2021-10-04 PROCEDURE — 503001 HCHG PERFUSION: Performed by: INTERNAL MEDICINE

## 2021-10-04 PROCEDURE — 700101 HCHG RX REV CODE 250: Performed by: INTERNAL MEDICINE

## 2021-10-04 PROCEDURE — 160035 HCHG PACU - 1ST 60 MINS PHASE I: Performed by: INTERNAL MEDICINE

## 2021-10-04 DEVICE — KIT TRANSCATHETER HEART VALVE  SAPIEN-3 29MM: Type: IMPLANTABLE DEVICE | Site: HEART | Status: FUNCTIONAL

## 2021-10-04 RX ORDER — FAMOTIDINE 20 MG/1
20 TABLET, FILM COATED ORAL DAILY
Status: DISCONTINUED | OUTPATIENT
Start: 2021-10-04 | End: 2021-10-05 | Stop reason: HOSPADM

## 2021-10-04 RX ORDER — DEXAMETHASONE SODIUM PHOSPHATE 4 MG/ML
INJECTION, SOLUTION INTRA-ARTICULAR; INTRALESIONAL; INTRAMUSCULAR; INTRAVENOUS; SOFT TISSUE PRN
Status: DISCONTINUED | OUTPATIENT
Start: 2021-10-04 | End: 2021-10-04 | Stop reason: SURG

## 2021-10-04 RX ORDER — HYDROMORPHONE HYDROCHLORIDE 1 MG/ML
0.4 INJECTION, SOLUTION INTRAMUSCULAR; INTRAVENOUS; SUBCUTANEOUS
Status: DISCONTINUED | OUTPATIENT
Start: 2021-10-04 | End: 2021-10-04 | Stop reason: HOSPADM

## 2021-10-04 RX ORDER — TAMSULOSIN HYDROCHLORIDE 0.4 MG/1
0.4 CAPSULE ORAL EVERY EVENING
Status: DISCONTINUED | OUTPATIENT
Start: 2021-10-04 | End: 2021-10-05 | Stop reason: HOSPADM

## 2021-10-04 RX ORDER — PROTAMINE SULFATE 10 MG/ML
INJECTION, SOLUTION INTRAVENOUS PRN
Status: DISCONTINUED | OUTPATIENT
Start: 2021-10-04 | End: 2021-10-04 | Stop reason: SURG

## 2021-10-04 RX ORDER — BISACODYL 10 MG
10 SUPPOSITORY, RECTAL RECTAL
Status: DISCONTINUED | OUTPATIENT
Start: 2021-10-04 | End: 2021-10-05 | Stop reason: HOSPADM

## 2021-10-04 RX ORDER — SODIUM CHLORIDE 9 MG/ML
INJECTION, SOLUTION INTRAVENOUS CONTINUOUS
Status: ACTIVE | OUTPATIENT
Start: 2021-10-04 | End: 2021-10-04

## 2021-10-04 RX ORDER — ACETAMINOPHEN 325 MG/1
650 TABLET ORAL EVERY 6 HOURS PRN
Status: DISCONTINUED | OUTPATIENT
Start: 2021-10-04 | End: 2021-10-05 | Stop reason: HOSPADM

## 2021-10-04 RX ORDER — POLYETHYLENE GLYCOL 3350 17 G/17G
1 POWDER, FOR SOLUTION ORAL
Status: DISCONTINUED | OUTPATIENT
Start: 2021-10-04 | End: 2021-10-05 | Stop reason: HOSPADM

## 2021-10-04 RX ORDER — CALCIUM CARBONATE 500 MG/1
500 TABLET, CHEWABLE ORAL EVERY 6 HOURS PRN
Status: DISCONTINUED | OUTPATIENT
Start: 2021-10-04 | End: 2021-10-04

## 2021-10-04 RX ORDER — AMOXICILLIN 250 MG
2 CAPSULE ORAL 2 TIMES DAILY PRN
Status: DISCONTINUED | OUTPATIENT
Start: 2021-10-04 | End: 2021-10-05 | Stop reason: HOSPADM

## 2021-10-04 RX ORDER — HYDROMORPHONE HYDROCHLORIDE 1 MG/ML
0.2 INJECTION, SOLUTION INTRAMUSCULAR; INTRAVENOUS; SUBCUTANEOUS
Status: DISCONTINUED | OUTPATIENT
Start: 2021-10-04 | End: 2021-10-04 | Stop reason: HOSPADM

## 2021-10-04 RX ORDER — DIPHENHYDRAMINE HYDROCHLORIDE 50 MG/ML
12.5 INJECTION INTRAMUSCULAR; INTRAVENOUS
Status: DISCONTINUED | OUTPATIENT
Start: 2021-10-04 | End: 2021-10-04 | Stop reason: HOSPADM

## 2021-10-04 RX ORDER — OXYCODONE HCL 5 MG/5 ML
5 SOLUTION, ORAL ORAL
Status: DISCONTINUED | OUTPATIENT
Start: 2021-10-04 | End: 2021-10-04 | Stop reason: HOSPADM

## 2021-10-04 RX ORDER — LIDOCAINE HYDROCHLORIDE 20 MG/ML
INJECTION, SOLUTION INFILTRATION; PERINEURAL
Status: DISCONTINUED | OUTPATIENT
Start: 2021-10-04 | End: 2021-10-04 | Stop reason: HOSPADM

## 2021-10-04 RX ORDER — DIPHENHYDRAMINE HYDROCHLORIDE 50 MG/ML
25 INJECTION INTRAMUSCULAR; INTRAVENOUS EVERY 6 HOURS PRN
Status: DISCONTINUED | OUTPATIENT
Start: 2021-10-04 | End: 2021-10-05 | Stop reason: HOSPADM

## 2021-10-04 RX ORDER — HALOPERIDOL 5 MG/ML
1 INJECTION INTRAMUSCULAR
Status: DISCONTINUED | OUTPATIENT
Start: 2021-10-04 | End: 2021-10-04 | Stop reason: HOSPADM

## 2021-10-04 RX ORDER — LORAZEPAM 0.5 MG/1
.25-.5 TABLET ORAL 2 TIMES DAILY PRN
Status: DISCONTINUED | OUTPATIENT
Start: 2021-10-04 | End: 2021-10-05 | Stop reason: HOSPADM

## 2021-10-04 RX ORDER — MEPERIDINE HYDROCHLORIDE 25 MG/ML
12.5 INJECTION INTRAMUSCULAR; INTRAVENOUS; SUBCUTANEOUS
Status: DISCONTINUED | OUTPATIENT
Start: 2021-10-04 | End: 2021-10-04 | Stop reason: HOSPADM

## 2021-10-04 RX ORDER — SODIUM CHLORIDE, SODIUM LACTATE, POTASSIUM CHLORIDE, CALCIUM CHLORIDE 600; 310; 30; 20 MG/100ML; MG/100ML; MG/100ML; MG/100ML
INJECTION, SOLUTION INTRAVENOUS CONTINUOUS
Status: ACTIVE | OUTPATIENT
Start: 2021-10-04 | End: 2021-10-04

## 2021-10-04 RX ORDER — REMIFENTANIL HYDROCHLORIDE 1 MG/ML
INJECTION, POWDER, LYOPHILIZED, FOR SOLUTION INTRAVENOUS
Status: DISCONTINUED | OUTPATIENT
Start: 2021-10-04 | End: 2021-10-04 | Stop reason: SURG

## 2021-10-04 RX ORDER — SIMVASTATIN 10 MG
10 TABLET ORAL EVERY EVENING
Status: DISCONTINUED | OUTPATIENT
Start: 2021-10-04 | End: 2021-10-05 | Stop reason: HOSPADM

## 2021-10-04 RX ORDER — CALCIUM CARBONATE 500 MG/1
500 TABLET, CHEWABLE ORAL EVERY 6 HOURS PRN
Status: DISCONTINUED | OUTPATIENT
Start: 2021-10-04 | End: 2021-10-05 | Stop reason: HOSPADM

## 2021-10-04 RX ORDER — ONDANSETRON 2 MG/ML
4 INJECTION INTRAMUSCULAR; INTRAVENOUS EVERY 4 HOURS PRN
Status: DISCONTINUED | OUTPATIENT
Start: 2021-10-04 | End: 2021-10-05 | Stop reason: HOSPADM

## 2021-10-04 RX ORDER — BUPIVACAINE HYDROCHLORIDE 2.5 MG/ML
INJECTION, SOLUTION EPIDURAL; INFILTRATION; INTRACAUDAL
Status: DISCONTINUED | OUTPATIENT
Start: 2021-10-04 | End: 2021-10-04 | Stop reason: HOSPADM

## 2021-10-04 RX ORDER — ONDANSETRON 2 MG/ML
INJECTION INTRAMUSCULAR; INTRAVENOUS PRN
Status: DISCONTINUED | OUTPATIENT
Start: 2021-10-04 | End: 2021-10-04 | Stop reason: SURG

## 2021-10-04 RX ORDER — HEPARIN SODIUM,PORCINE 1000/ML
VIAL (ML) INJECTION PRN
Status: DISCONTINUED | OUTPATIENT
Start: 2021-10-04 | End: 2021-10-04 | Stop reason: SURG

## 2021-10-04 RX ORDER — CEFAZOLIN SODIUM 1 G/3ML
INJECTION, POWDER, FOR SOLUTION INTRAMUSCULAR; INTRAVENOUS PRN
Status: DISCONTINUED | OUTPATIENT
Start: 2021-10-04 | End: 2021-10-04 | Stop reason: SURG

## 2021-10-04 RX ORDER — MIDAZOLAM HYDROCHLORIDE 1 MG/ML
INJECTION INTRAMUSCULAR; INTRAVENOUS PRN
Status: DISCONTINUED | OUTPATIENT
Start: 2021-10-04 | End: 2021-10-04 | Stop reason: SURG

## 2021-10-04 RX ORDER — HYDRALAZINE HYDROCHLORIDE 20 MG/ML
10 INJECTION INTRAMUSCULAR; INTRAVENOUS
Status: DISCONTINUED | OUTPATIENT
Start: 2021-10-04 | End: 2021-10-05 | Stop reason: HOSPADM

## 2021-10-04 RX ORDER — MIRTAZAPINE 15 MG/1
15 TABLET, FILM COATED ORAL
Status: DISCONTINUED | OUTPATIENT
Start: 2021-10-04 | End: 2021-10-05 | Stop reason: HOSPADM

## 2021-10-04 RX ORDER — HYDROMORPHONE HYDROCHLORIDE 1 MG/ML
0.1 INJECTION, SOLUTION INTRAMUSCULAR; INTRAVENOUS; SUBCUTANEOUS
Status: DISCONTINUED | OUTPATIENT
Start: 2021-10-04 | End: 2021-10-04 | Stop reason: HOSPADM

## 2021-10-04 RX ORDER — AMLODIPINE BESYLATE 5 MG/1
2.5 TABLET ORAL EVERY EVENING
Status: DISCONTINUED | OUTPATIENT
Start: 2021-10-04 | End: 2021-10-05 | Stop reason: HOSPADM

## 2021-10-04 RX ORDER — OXYCODONE HCL 5 MG/5 ML
10 SOLUTION, ORAL ORAL
Status: DISCONTINUED | OUTPATIENT
Start: 2021-10-04 | End: 2021-10-04 | Stop reason: HOSPADM

## 2021-10-04 RX ORDER — DIPHENHYDRAMINE HCL 25 MG
25 TABLET ORAL NIGHTLY PRN
Status: DISCONTINUED | OUTPATIENT
Start: 2021-10-04 | End: 2021-10-05 | Stop reason: HOSPADM

## 2021-10-04 RX ORDER — LORAZEPAM 0.5 MG/1
0.25 TABLET ORAL EVERY 4 HOURS PRN
Status: ON HOLD | COMMUNITY
End: 2021-10-04

## 2021-10-04 RX ORDER — ONDANSETRON 2 MG/ML
4 INJECTION INTRAMUSCULAR; INTRAVENOUS
Status: DISCONTINUED | OUTPATIENT
Start: 2021-10-04 | End: 2021-10-04 | Stop reason: HOSPADM

## 2021-10-04 RX ADMIN — REMIFENTANIL HYDROCHLORIDE 0.1 MCG/KG/MIN: 1 INJECTION, POWDER, LYOPHILIZED, FOR SOLUTION INTRAVENOUS at 07:41

## 2021-10-04 RX ADMIN — HEPARIN SODIUM 7000 UNITS: 1000 INJECTION, SOLUTION INTRAVENOUS; SUBCUTANEOUS at 08:04

## 2021-10-04 RX ADMIN — MIDAZOLAM HYDROCHLORIDE 2 MG: 1 INJECTION, SOLUTION INTRAMUSCULAR; INTRAVENOUS at 07:48

## 2021-10-04 RX ADMIN — SIMVASTATIN 10 MG: 10 TABLET, FILM COATED ORAL at 17:42

## 2021-10-04 RX ADMIN — CALCIUM CARBONATE 500 MG: 500 TABLET, CHEWABLE ORAL at 19:35

## 2021-10-04 RX ADMIN — DEXAMETHASONE SODIUM PHOSPHATE 4 MG: 4 INJECTION, SOLUTION INTRA-ARTICULAR; INTRALESIONAL; INTRAMUSCULAR; INTRAVENOUS; SOFT TISSUE at 07:48

## 2021-10-04 RX ADMIN — LIDOCAINE HYDROCHLORIDE 0.5 ML: 10 INJECTION, SOLUTION EPIDURAL; INFILTRATION; INTRACAUDAL; PERINEURAL at 06:28

## 2021-10-04 RX ADMIN — HYDRALAZINE HYDROCHLORIDE 10 MG: 20 INJECTION INTRAMUSCULAR; INTRAVENOUS at 13:14

## 2021-10-04 RX ADMIN — PROPOFOL 50 MCG/KG/MIN: 10 INJECTION, EMULSION INTRAVENOUS at 07:45

## 2021-10-04 RX ADMIN — MIRTAZAPINE 15 MG: 15 TABLET, FILM COATED ORAL at 20:27

## 2021-10-04 RX ADMIN — SODIUM CHLORIDE, POTASSIUM CHLORIDE, SODIUM LACTATE AND CALCIUM CHLORIDE: 600; 310; 30; 20 INJECTION, SOLUTION INTRAVENOUS at 06:28

## 2021-10-04 RX ADMIN — ACETAMINOPHEN 650 MG: 325 TABLET, FILM COATED ORAL at 14:01

## 2021-10-04 RX ADMIN — PROPOFOL 50 MG: 10 INJECTION, EMULSION INTRAVENOUS at 07:41

## 2021-10-04 RX ADMIN — ONDANSETRON 4 MG: 2 INJECTION INTRAMUSCULAR; INTRAVENOUS at 07:48

## 2021-10-04 RX ADMIN — SODIUM CHLORIDE: 9 INJECTION, SOLUTION INTRAVENOUS at 11:01

## 2021-10-04 RX ADMIN — CEFAZOLIN 2 G: 330 INJECTION, POWDER, FOR SOLUTION INTRAMUSCULAR; INTRAVENOUS at 07:47

## 2021-10-04 RX ADMIN — PROTAMINE SULFATE 50 MG: 10 INJECTION, SOLUTION INTRAVENOUS at 08:27

## 2021-10-04 RX ADMIN — AMLODIPINE BESYLATE 2.5 MG: 5 TABLET ORAL at 17:42

## 2021-10-04 RX ADMIN — FAMOTIDINE 20 MG: 20 TABLET ORAL at 16:10

## 2021-10-04 ASSESSMENT — COGNITIVE AND FUNCTIONAL STATUS - GENERAL
MOBILITY SCORE: 24
SUGGESTED CMS G CODE MODIFIER MOBILITY: CH
SUGGESTED CMS G CODE MODIFIER DAILY ACTIVITY: CH
DAILY ACTIVITIY SCORE: 24

## 2021-10-04 ASSESSMENT — LIFESTYLE VARIABLES
HAVE YOU EVER FELT YOU SHOULD CUT DOWN ON YOUR DRINKING: NO
ON A TYPICAL DAY WHEN YOU DRINK ALCOHOL HOW MANY DRINKS DO YOU HAVE: 0
TOTAL SCORE: 0
AVERAGE NUMBER OF DAYS PER WEEK YOU HAVE A DRINK CONTAINING ALCOHOL: 0
DOES PATIENT WANT TO STOP DRINKING: NO
TOTAL SCORE: 0
TOTAL SCORE: 0
ALCOHOL_USE: NO
HAVE PEOPLE ANNOYED YOU BY CRITICIZING YOUR DRINKING: NO
CONSUMPTION TOTAL: NEGATIVE
EVER FELT BAD OR GUILTY ABOUT YOUR DRINKING: NO
HOW MANY TIMES IN THE PAST YEAR HAVE YOU HAD 5 OR MORE DRINKS IN A DAY: 0
EVER HAD A DRINK FIRST THING IN THE MORNING TO STEADY YOUR NERVES TO GET RID OF A HANGOVER: NO

## 2021-10-04 ASSESSMENT — FIBROSIS 4 INDEX: FIB4 SCORE: 1.16

## 2021-10-04 ASSESSMENT — PATIENT HEALTH QUESTIONNAIRE - PHQ9
1. LITTLE INTEREST OR PLEASURE IN DOING THINGS: NOT AT ALL
2. FEELING DOWN, DEPRESSED, IRRITABLE, OR HOPELESS: NOT AT ALL
SUM OF ALL RESPONSES TO PHQ9 QUESTIONS 1 AND 2: 0

## 2021-10-04 ASSESSMENT — PAIN DESCRIPTION - PAIN TYPE
TYPE: ACUTE PAIN

## 2021-10-04 ASSESSMENT — PAIN SCALES - GENERAL: PAIN_LEVEL: 2

## 2021-10-04 NOTE — PROGRESS NOTES
TR band removed, right radial site covered with gauze and Tegaderm, dressing CDI, no complaints of pain, no complaints of numbness or tingling.

## 2021-10-04 NOTE — PROGRESS NOTES
Report received from Jaymie PACU RN. Updated on POC.  Assumed care of patient upon arrival to unit. Patient currently A & O x 4; on RA; up without complaints of acute pain. Pt placed on monitor, monitor room notified, SR 71. Patient oriented to unit and to call light system. Call light within reach. Pt educated to fall risk. Fall precautions in place. Pt provided with personal grooming items. Bed locked and in lowest position. All questions answered. No other needs indicated at this time. Sign and held orders released. Bilateral groin sites CDI. Right radial site covered with TR band. Post op vitals initiated.

## 2021-10-04 NOTE — PROGRESS NOTES
4 Eyes Skin Assessment Completed by PATRICIA Freeman and PATRICIA Trammell.    Head WDL  Ears WDL  Nose Redness  Mouth WDL  Neck WDL  Breast/Chest WDL  Shoulder Blades WDL  Spine Redness and Blanching  (R) Arm/Elbow/Hand WDL  (L) Arm/Elbow/Hand WDL  Abdomen WDL  Groin WDL  Scrotum/Coccyx/Buttocks Redness and Blanching  (R) Leg WDL  (L) Leg WDL  (R) Heel/Foot/Toe WDL  (L) Heel/Foot/Toe WDL  Bilateral groin sites covered with Dermabond CDI  Right radial wrist TR band  Trunk red from pads    Devices In Places Tele Box, Pulse Ox, SCD's and ROZ's    Interventions In Place Heels Loaded W/Pillows    Possible Skin Injury No    Pictures Uploaded Into Epic N/A  Wound Consult Placed N/A  RN Wound Prevention Protocol Ordered Yes

## 2021-10-04 NOTE — ANESTHESIA TIME REPORT
Anesthesia Start and Stop Event Times     Date Time Event    10/4/2021 0710 Ready for Procedure     0726 Anesthesia Start     0847 Anesthesia Stop        Responsible Staff  10/04/21    Name Role Begin End    Silverio Cavazos M.D. Anesth 0726 0847        Preop Diagnosis (Free Text):  Pre-op Diagnosis     SEVERE AORTIC STENOSIS        Preop Diagnosis (Codes):    Premium Reason  Non-Premium    Comments: tte

## 2021-10-04 NOTE — PROGRESS NOTES
Med rec updated and complete  Allergies reviewed  Interviewed pt with daughter at bedside with permission from pt.  Pt had a list of medications at bedside, went over list of medications and returned list of medications back to pts daughter  Pt reports that he has not started his TAMSULOSIN 0.4MG  Pt reports no antibiotics in the last 30 days.

## 2021-10-04 NOTE — ANESTHESIA PREPROCEDURE EVALUATION
Relevant Problems   CARDIAC   (positive) Essential hypertension   (positive) [place]   (positive) Mild carotid artery disease (HCC)   (positive) Severe aortic stenosis      GI   (positive) Gastroesophageal reflux disease without esophagitis       Physical Exam    Airway   Mallampati: II  TM distance: >3 FB  Neck ROM: full       Cardiovascular - normal exam  Rhythm: regular  Rate: normal  (-) murmur     Dental - normal exam           Pulmonary - normal exam  Breath sounds clear to auscultation     Abdominal    Neurological - normal exam                 Anesthesia Plan    ASA 3       Plan - general               Induction: intravenous    Postoperative Plan: Postoperative administration of opioids is intended.    Pertinent diagnostic labs and testing reviewed    Informed Consent:    Anesthetic plan and risks discussed with patient.    Use of blood products discussed with: patient whom consented to blood products.

## 2021-10-04 NOTE — OR SURGEON
OPERATIVE REPORT    Operation date: 10/4/2021.    Referring physician: FLOYD Alfonso MD.    PreOp Diagnosis: Severe symptomatic aortic stenosis.    PostOp Diagnosis: Severe symptomatic aortic stenosis.    Procedure(s):  Transcatheter aortic valve replacement (TAVR 29 mm S3 Gibson pericardial valve, +1 mL in balloon)  Transthoracic echocardiography    Surgeon(s):  IVY Covarrubias M.D.    Assistant:  Mac Currie M.D.    Anesthesiologist/Type of Anesthesia:  Anesthesiologist: Silverio Cavazos M.D./conscious sedation.    Surgical Staff:  Circulator: Lizbet Hernandez; Nabeel Allan R.N.; Adis Bales R.N.  Perfusionist: Dennys Guajardo  Scrub Person: Jennifer Mahmood    Specimens removed if any: None.    Estimated Blood Loss: Minimal.    Findings: Severe aortic stenosis.    Complications: None.    Indications:  Mr. Farooq is a 66-year-old male with severe symptomatic aortic stenosis.    Procedure:  The patient was brought to the operating room and placed on the operating room table in the supine position.  Conscious sedation was administered by Silverio Cavazos MD.  The patient was then prepped and draped in the usual sterile fashion.  Ultrasound-guided right radial arterial, right femoral venous and left femoral arterial access was obtained.  A temporary transvenous pacemaker was placed in the right ventricle and the pigtail catheter in the right coronary sinus.  The deployment angle was determined.  A 1 cm incision was made in the left groin and 2 Perclose sutures were deployed.  A 16 [place]raini eSheath was then placed in the left common femoral artery and its tip was positioned in the abdominal aorta.  The aortic valve was crossed with a wire.  A deployment catheter with a 29 mm S3 Gibson pericardial valve at its tip was positioned across the aortic annulus.  1 additional mL of volume was added to the balloon which was inflated to a peak pressure of 7.5 sharon during implantation.  Wires  and catheters were removed.  Intraoperative transthoracic echocardiography did not show any paravalvular or central leaks.  The left femoral eSheath was removed and the Perclose sutures were tightened down.  Dermabond was applied over the small left groin incision.  The remainder of the operation will be dictated by Dr. Alfonso.  There were no apparent complications.  The patient tolerated the procedure well and left the operating room in stable condition.      10/4/2021 8:42 AM Jade Mak M.D.

## 2021-10-04 NOTE — OR NURSING
0945 Pt out from OR post TAVR. Bilateral groin sites CDI and soft, no signs of bleeding. Right wrist with TR band in place is dry and soft. VSS. Pt awakens to voice. Denies pain or nausea.  0930 2 mls of air removed from TR band. Site CDI and no signs of bleeding.  0940 Tolerating orals  0945 2 mls of air removed from TR band. Site CDI and no signs of bleeding.   0954 Report to PATRICIA Freeman  0955 Called Randi, pt's daughter, updated her on POC  1000 2 mls of air removed from TR band. Site CDI and no signs of bleeding.   1005 Pt transported to [facility] with all belongings by RN and CNA. Handoff to PATRICIA Freeman. Bilateral groin sites CDI and soft, no signs of bleeding. TR band right radial, no signs of bleeding.

## 2021-10-04 NOTE — OP REPORT
DATE OF PROCEDURE: 10/4/21    REFERRING PHYSICIAN:     PROCEDURES performed:  1. Transcatheter aortic valve replacement.  2. Insertion and removal of temporary pacer wire    PRE PROCEDURAL DIAGNOSIS:  1. Severe symptomatic aortic stenosis, NYHA II.    Operators:  CT Surgeon:   Interventional cardiologist: Dr. Alfonso,     DESCRIPTION OF PROCEDURE:  After informed consent was signed by the   patient, the patient was brought into the operating room, was prepped and draped in   usual sterile manner.    conscious sedation and   transthoracicechocardiogram was provided by .    Primary Arterial access:   left femoral artery was cannulated using modified Seldinger technique under ultrasound guidance, a 6 Yemeni sheath was inserted.  Sheathogram confirmed common femoral arterial access. 2 Perclose sutures were placed, arteriotomy was serially dilated,16 Fr sheath was inserted in the femoral artery over a stiff Lunderquist wire.    Secondary arterial access:  right radial artery was cannulated with 6 Yemeni sheath.  A pigtail catheter was advanced through 6 Yemeni sheath, aortic root angiogram was performed to determine coplanar view.    Venous Access:   right femoral vein was cannulated with 6 Yemeni sheath, a temporary pacer wire was advanced to RV apex in usual fashion.    Through Gibson self-expandable sheath, AL-1 catheter was advanced to aortic root, straight wire was used to cross the aortic valve, a stiff Amplatz wire was placed in the LV apex and AL-1 catheter was withdrawn. 29 mm Gibson Jose C 3 sapphire was prepped in usual fashion with one additional cc of volume, orientation was confirmed.  Sapient 3 valve was slowly advanced over a stiff Amplatz wire to aortic position by  under my direct supervision.  Rapid pacing was achieved using temporary pacer wire, aortic root angiogram was performed and after confirming good positioning, valve was slowly deployed under  fluoroscopic guidance by  at 7 sharon. Post procedure echocardiogram showed no significant paravalvular leak, good valve positioning.    The patient tolerated the procedure well. At the end of procedure, all pacemaker wire,   pigtail catheters and sheaths were removed.  The primary arterial access site was closed with the PreClose system.  The secondary arterial access was closed via vasc-band. The patient was then extubated and transferred to PACU in stable condition.    Complications: none  Specimens: none  Estimated blood loss: <50cc      IMPRESSION:  Successful transcatheter aortic valve replacement using 29 mm Gibson delia 3 valve with one additional cc of volume.    RECOMMENDATION:   Guideline directed medical therapy.    Akash Alfonso  Interventional cardiologist

## 2021-10-04 NOTE — ANESTHESIA POSTPROCEDURE EVALUATION
Patient: Brennan Farooq Jr.    Procedure Summary     Date: 10/04/21 Room / Location: [place] [address] / SURGERY [facility]    Anesthesia Start: 0726 Anesthesia Stop: 0847    Procedures:       REPLACEMENT, AORTIC VALVE, TRANSCATHETER (Bilateral Groin)      ECHOCARDIOGRAM,TRANSTHORACIC,INTRAOPERATIVE (N/A Chest) Diagnosis: (SEVERE AORTIC STENOSIS)    Surgeons: Akash Alfonso M.D. Responsible Provider: Silverio Cavazos M.D.    Anesthesia Type: general ASA Status: 3          Final Anesthesia Type: general  Last vitals  BP   Blood Pressure : 134/68    Temp   36.3 °C (97.4 °F)    Pulse   77   Resp   20    SpO2   98 %      Anesthesia Post Evaluation    Patient location during evaluation: PACU  Patient participation: complete - patient participated  Level of consciousness: awake and alert  Pain score: 2    Airway patency: patent  Anesthetic complications: no  Cardiovascular status: hemodynamically stable  Respiratory status: acceptable  Hydration status: euvolemic    PONV: none          No complications documented.     Nurse Pain Score: 0 (NPRS)

## 2021-10-04 NOTE — CARE PLAN
Problem: Pain - Standard  Goal: Alleviation of pain or a reduction in pain to the patient’s comfort goal  Outcome: Progressing  Flowsheets (Taken 10/4/2021 1000 by Jailene Osorio RAramisNAramis)  Pain Rating Scale (NPRS): 3     Problem: Knowledge Deficit - Standard  Goal: Patient and family/care givers will demonstrate understanding of plan of care, disease process/condition, diagnostic tests and medications  Outcome: Progressing     The patient is Watcher - Medium risk of patient condition declining or worsening         Progress made toward(s) clinical / shift goals:  TAVR procedure    Patient is not progressing towards the following goals: back ache from laying on gurney

## 2021-10-05 ENCOUNTER — PHARMACY VISIT (OUTPATIENT)
Dept: PHARMACY | Facility: MEDICAL CENTER | Age: 66
End: 2021-10-05
Payer: MEDICARE

## 2021-10-05 ENCOUNTER — APPOINTMENT (OUTPATIENT)
Dept: RADIOLOGY | Facility: MEDICAL CENTER | Age: 66
DRG: 267 | End: 2021-10-05
Attending: NURSE PRACTITIONER
Payer: MEDICARE

## 2021-10-05 ENCOUNTER — TELEPHONE (OUTPATIENT)
Dept: CARDIOLOGY | Facility: MEDICAL CENTER | Age: 66
End: 2021-10-05

## 2021-10-05 ENCOUNTER — APPOINTMENT (OUTPATIENT)
Dept: CARDIOLOGY | Facility: MEDICAL CENTER | Age: 66
DRG: 267 | End: 2021-10-05
Attending: NURSE PRACTITIONER
Payer: MEDICARE

## 2021-10-05 VITALS
SYSTOLIC BLOOD PRESSURE: 135 MMHG | OXYGEN SATURATION: 94 % | HEART RATE: 85 BPM | BODY MASS INDEX: 23.45 KG/M2 | DIASTOLIC BLOOD PRESSURE: 61 MMHG | HEIGHT: 70 IN | WEIGHT: 163.8 LBS | RESPIRATION RATE: 18 BRPM | TEMPERATURE: 98.8 F

## 2021-10-05 LAB
ALBUMIN SERPL BCP-MCNC: 4.4 G/DL (ref 3.2–4.9)
ALBUMIN/GLOB SERPL: 1.9 G/DL
ALP SERPL-CCNC: 55 U/L (ref 30–99)
ALT SERPL-CCNC: 9 U/L (ref 2–50)
ANION GAP SERPL CALC-SCNC: 14 MMOL/L (ref 7–16)
AST SERPL-CCNC: 22 U/L (ref 12–45)
BILIRUB SERPL-MCNC: 0.6 MG/DL (ref 0.1–1.5)
BUN SERPL-MCNC: 17 MG/DL (ref 8–22)
CALCIUM SERPL-MCNC: 9.3 MG/DL (ref 8.5–10.5)
CHLORIDE SERPL-SCNC: 105 MMOL/L (ref 96–112)
CO2 SERPL-SCNC: 23 MMOL/L (ref 20–33)
CREAT SERPL-MCNC: 1.15 MG/DL (ref 0.5–1.4)
EKG IMPRESSION: NORMAL
ERYTHROCYTE [DISTWIDTH] IN BLOOD BY AUTOMATED COUNT: 42.7 FL (ref 35.9–50)
GLOBULIN SER CALC-MCNC: 2.3 G/DL (ref 1.9–3.5)
GLUCOSE SERPL-MCNC: 116 MG/DL (ref 65–99)
HCT VFR BLD AUTO: 39.4 % (ref 42–52)
HGB BLD-MCNC: 13.5 G/DL (ref 14–18)
LV EJECT FRACT  99904: 75
LV EJECT FRACT MOD 2C 99903: 66.01
LV EJECT FRACT MOD 4C 99902: 72.62
LV EJECT FRACT MOD BP 99901: 69.27
MCH RBC QN AUTO: 31.8 PG (ref 27–33)
MCHC RBC AUTO-ENTMCNC: 34.3 G/DL (ref 33.7–35.3)
MCV RBC AUTO: 92.9 FL (ref 81.4–97.8)
NT-PROBNP SERPL IA-MCNC: 330 PG/ML (ref 0–125)
PLATELET # BLD AUTO: 181 K/UL (ref 164–446)
PMV BLD AUTO: 9.8 FL (ref 9–12.9)
POTASSIUM SERPL-SCNC: 4.2 MMOL/L (ref 3.6–5.5)
PROT SERPL-MCNC: 6.7 G/DL (ref 6–8.2)
RBC # BLD AUTO: 4.24 M/UL (ref 4.7–6.1)
SODIUM SERPL-SCNC: 142 MMOL/L (ref 135–145)
WBC # BLD AUTO: 11.5 K/UL (ref 4.8–10.8)

## 2021-10-05 PROCEDURE — 85027 COMPLETE CBC AUTOMATED: CPT

## 2021-10-05 PROCEDURE — 700102 HCHG RX REV CODE 250 W/ 637 OVERRIDE(OP): Performed by: NURSE PRACTITIONER

## 2021-10-05 PROCEDURE — 83880 ASSAY OF NATRIURETIC PEPTIDE: CPT

## 2021-10-05 PROCEDURE — 93306 TTE W/DOPPLER COMPLETE: CPT

## 2021-10-05 PROCEDURE — 93010 ELECTROCARDIOGRAM REPORT: CPT | Performed by: INTERNAL MEDICINE

## 2021-10-05 PROCEDURE — A9270 NON-COVERED ITEM OR SERVICE: HCPCS | Performed by: NURSE PRACTITIONER

## 2021-10-05 PROCEDURE — 36415 COLL VENOUS BLD VENIPUNCTURE: CPT

## 2021-10-05 PROCEDURE — RXMED WILLOW AMBULATORY MEDICATION CHARGE: Performed by: NURSE PRACTITIONER

## 2021-10-05 PROCEDURE — 71045 X-RAY EXAM CHEST 1 VIEW: CPT

## 2021-10-05 PROCEDURE — 93306 TTE W/DOPPLER COMPLETE: CPT | Mod: 26 | Performed by: INTERNAL MEDICINE

## 2021-10-05 PROCEDURE — 99238 HOSP IP/OBS DSCHRG MGMT 30/<: CPT | Performed by: INTERNAL MEDICINE

## 2021-10-05 PROCEDURE — 80053 COMPREHEN METABOLIC PANEL: CPT

## 2021-10-05 PROCEDURE — 97161 PT EVAL LOW COMPLEX 20 MIN: CPT

## 2021-10-05 PROCEDURE — 93005 ELECTROCARDIOGRAM TRACING: CPT | Performed by: NURSE PRACTITIONER

## 2021-10-05 RX ORDER — ASPIRIN 81 MG/1
81 TABLET ORAL DAILY
Qty: 30 TABLET | Refills: 11 | Status: SHIPPED | OUTPATIENT
Start: 2021-10-06

## 2021-10-05 RX ADMIN — ASPIRIN 81 MG: 81 TABLET, COATED ORAL at 06:15

## 2021-10-05 RX ADMIN — FAMOTIDINE 20 MG: 20 TABLET ORAL at 06:15

## 2021-10-05 ASSESSMENT — COGNITIVE AND FUNCTIONAL STATUS - GENERAL
SUGGESTED CMS G CODE MODIFIER MOBILITY: CH
MOBILITY SCORE: 24

## 2021-10-05 ASSESSMENT — GAIT ASSESSMENTS
DISTANCE (FEET): 300
DEVIATION: NO DEVIATION
GAIT LEVEL OF ASSIST: SUPERVISED

## 2021-10-05 ASSESSMENT — FIBROSIS 4 INDEX: FIB4 SCORE: 2.76

## 2021-10-05 NOTE — CARE PLAN
Problem: Day of surgery post CABG/Heart valve replacement  Goal: Stabilization in immediate post op period  Outcome: Progressing  TAVR education     The patient is Watcher - Medium risk of patient condition declining or worsening    Shift Goals: TAVR education   Clinical Goals: Neurological and peripheral vascular assessment  Patient Goals: Sit up in chair    Progress made toward(s) clinical / shift goals:  Post op vitals completed, neurological assessment intact.    Patient is not progressing towards the following goals: Patient anxious regarding hospital stay

## 2021-10-05 NOTE — PROGRESS NOTES
COVID-19 surge in effect    Bedside report received from day shift RN. Assumed care at 1900. Pt is A&Ox4. Pt is in bed. Pt denies pain at this time. Pt was updated on plan of care. Pt has call light, personal belongings, and bedside table within reach. Bed is in the lowest position. Bilateral groin and right radial site CDI, soft. Will continue to monitor

## 2021-10-05 NOTE — THERAPY
Physical Therapy   Initial Evaluation     Patient Name: Brennan Farooq Jr.  Age:  66 y.o., Sex:  male  Medical Record #: [id number]  Today's Date: 10/5/2021     Precautions  Comments: s/p TAVR    Assessment  Patient is 66 y.o. male POD 1# TAVR. Cardiac handout was provided and discussed regarding talk test/RPE scale to modify home walking program, modifiable cardiac risk factors and role/timeline of outpatient cardiac rehab. Max HR calculated as 160 +/- 10 bpm with ideal exercise HR at 136bpm. Patient reporting compliance and understanding. Pt demonstrated all functional mobility at his baseline with no c/o SOB, CP, dizziness, or palpitations. No further acute PT, encouraged pt to participate in outpatient cardiac rehab    Plan    Recommend Physical Therapy for Evaluation only     DC Equipment Recommendations: None  Discharge Recommendations: Other - (outpatient cardiac rehab)          10/05/21 0844   Vitals   O2 (LPM) 0   O2 Delivery Device None - Room Air   Vitals Comments vitals stable throughout   Prior Living Situation   Prior Services None   Housing / Facility 1 Story House   Steps Into Home 0   Steps In Home 0   Equipment Owned None   Lives with - Patient's Self Care Capacity Alone and Able to Care For Self   Comments spouse passed away recently. Independent at baseline and working   Prior Level of Functional Mobility   Bed Mobility Independent   Transfer Status Independent   Ambulation Independent   Distance Ambulation (Feet)   (community)   Assistive Devices Used None   Stairs Independent   Comments independent at baseline   Cognition    Level of Consciousness Alert   Comments receptive to education   Active ROM Lower Body    Active ROM Lower Body  WDL   Strength Lower Body   Lower Body Strength  WDL   Sensation Lower Body   Lower Extremity Sensation   WDL   Lower Body Muscle Tone   Lower Body Muscle Tone  WDL   Balance Assessment   Sitting Balance (Static) Good   Sitting Balance (Dynamic) Good   Standing  Balance (Static) Fair +   Standing Balance (Dynamic) Fair +   Weight Shift Sitting Good   Weight Shift Standing Good   Comments no AD   Gait Analysis   Gait Level Of Assist Supervised   Assistive Device None   Distance (Feet) 300   # of Times Distance was Traveled 1   Deviation No deviation   # of Stairs Climbed 0   Weight Bearing Status no restrictions   Comments no cardiac complaints, talk test passed   Bed Mobility    Comments in chair pre and post   Functional Mobility   Sit to Stand Supervised   Mobility on unit   Education Group   Education Provided Role of Physical Therapist;Cardiac Precautions   Cardiac Precautions Patient Response Patient;Acceptance;Explanation;Handout;Action Demonstration   Role of Physical Therapist Patient Response Patient;Acceptance;Demonstration;Action Demonstration   Anticipated Discharge Equipment and Recommendations   DC Equipment Recommendations None   Discharge Recommendations Other -  (outpatient cardiac rehab)

## 2021-10-05 NOTE — PROGRESS NOTES
Monitor Summary  Rhythm: SR/ST  Rate:   Ectopy: (R) PVC, (R) Coup, (R) Trip  .16 / .08 / .35  Tachycardia up to 130 with ambulation, Cardiology RICO Gomez notified.

## 2021-10-05 NOTE — TELEPHONE ENCOUNTER
"Patient daughter calling to report patient woke from nap with chest pain, diaphoresis, and felt like his heart was \"fluttering\". Daughter took pulse currently elevated to . Patient reports chest pain as radiating to left posterior shoulder blade.     During phone call patient reports of symptoms resolving over the course of the call. Reviewed reported sxs with Dr. Alfonso. Per Dr. Alfonso, patient advised to take ONE lorazepam PO now,and if symptoms persist to have patient report to ED for further assessment. Daughter repeats back instructions and agrees with plan.     Patient and daughter report that patient is planned for sleep study in the future for possible need for CPAP, as patient often times \"wakes up gasping for air\". Daughter states that patient often times will be frightened by waking up while gasping for air. Assured patient and daughter Dr. Alfonso has been updated with all such information.     Patient and daughter agree for patient to take Rx as recommended by AK and will report to ED if sxs not resolved/improved.   "

## 2021-10-05 NOTE — DISCHARGE PLANNING
Meds-to-Beds: Discharge prescription orders listed below delivered to patient's bedside. RN Lydia notified. Patient counseled. Reviewed potential drug-drug interactions and signs and symptoms of bleeding. Patient elected to have co-payment billed to patient account. Patient elected to  lorazepam at [facility] pharmacy.     Current Outpatient Medications   Medication Sig Dispense Refill   • [START ON 10/6/2021] aspirin 81 MG EC tablet Take 1 Tablet by mouth every day. 30 Tablet 11      Palmira Bobo, PharmD

## 2021-10-05 NOTE — PROGRESS NOTES
Bedside report received from Sanam MANSFIELD RN. Pt appears to be resting comfortably. Call light and belongings within reach. Bed locked and in lowest position. Alarm and fall precautions in place.

## 2021-10-05 NOTE — DISCHARGE INSTRUCTIONS
Discharge Instructions    Discharged to home by car with relative. Discharged via wheelchair, hospital escort: Yes.  Special equipment needed: Not Applicable    Be sure to schedule a follow-up appointment with your primary care doctor or any specialists as instructed.     Discharge Plan:   Diet Plan: Discussed  Activity Level: Discussed  Confirmed Follow up Appointment: Appointment Scheduled  Confirmed Symptoms Management: Discussed  Medication Reconciliation Updated: Yes  Influenza Vaccine Indication: Patient Refuses    I understand that a diet low in cholesterol, fat, and sodium is recommended for good health. Unless I have been given specific instructions below for another diet, I accept this instruction as my diet prescription.   Other diet: Cardiac    Special Instructions:   Transcatheter Aortic Valve Replacement (TAVR)    General Instructions:  1. Take Medication as directed.  You will likely need to take aspirin and another blood thinner (antiplatelet medication) for a period.  You'll need to take the aspirin for the rest of your life.  Be sure your doctor knows about all other medicines you take, including over-the-counter medicines and dietary supplements.    Incision Care Instructions:  1. Look and feel the site(s) of your TAVR TODAY so you can recognize changes that should be called to your doctor (see below).  2. It's normal for your incision to be bruised, itchy, or sore while it's healing.  Your incision may take a week or more to heal.  3. Bruising may occur.  Some of the discoloration may travel down the leg.  As it resolves, the color should change from blue to green to yellow-brown.  4. A small, round lump under the TAVR site may remain for up to 6 weeks.  5. Wash your incision site every day with warm water and soap.  Gently pat it dry.  Don't put powder, lotion, or ointment on the incision until it's healed.    Activity:  1. No driving for one week  2. If you must take a long car ride (not as a  ), stop every hour and walk around the car.  3. No lifting or pulling objects over 5 pounds for 4-5 days.  4. Warm showers or baths are permitted after the bandage is removed.  5. Walk regularly.  One of the best ways to get stronger is to walk.  Walk a little more each day.  Take someone with you until you feel OK to walk alone.    When to call your health care provider:  1. You develop a fever.  2. Redness, swelling, bleeding, warmth, or fluid draining at the incision site.  3. Bruising appears to be new or does not look like it is getting better.  4. The small, round lump in the groin in the area of the TAVR site increases in size.    Seek immediate medical help if you have any of the following symptoms:  1. You experience any leg numbness, aching, or discomfort  2. Chest pain or trouble breathing (call 911)  3. Sudden numbness or weakness in your face, arms, or legs (call 911)  4. Bowel movement that is bright red  5. Dizziness or fainting  6. Weight gain of more than 2 pounds in 24 hours or more than 5 pounds in 1 week  7. Swelling in your hands, feet, or ankles  8. Shortness of breath that doesn't get better when you rest  9. Pain that gets worse or doesn't go away  10. Fast or irregular pulse       · Is patient discharged on Warfarin / Coumadin?   No     Transcatheter Aortic Valve Replacement    Transcatheter aortic valve replacement (TAVR) is a procedure to place an artificial aortic valve in the heart. The aortic valve controls blood flow between the heart and the main blood vessel that supplies blood to the body (aorta). During TAVR, a flexible tube (catheter) is placed through an incision in the groin, chest, or neck and is guided to the aortic valve. Then, the artificial valve is moved through the catheter and into the aortic valve, where it is expanded. The new valve takes over the function of the old valve, and it improves blood flow through the heart.  This procedure is also called transcatheter  aortic valve implantation (YADY). You may need TAVR if you have a stiff aortic valve (aortic stenosis) that is causing symptoms and open heart valve replacement surgery is not an option for you.  Tell a health care provider about:  · Any allergies you have.  · All medicines you are taking, including vitamins, herbs, eye drops, creams, and over-the-counter medicines.  · Any problems you or family members have had with anesthetic medicines.  · Any blood disorders you have.  · Any surgeries you have had.  · Any medical conditions you have.  · Whether you are pregnant or may be pregnant.  What are the risks?  Generally, this is a safe procedure. However, problems may occur, including:  · Bleeding.  · Damage to blood vessels or the heart.  · Stroke.  · Blood clots.  · Abnormal heart rhythms (arrhythmia).  · Heart attack.  · Allergic reactions to medicines or dyes.  · Infection.  · Kidney failure.  · Failure of the artificial valve.  What happens before the procedure?  Medicines  · Ask your health care provider about:  ? Changing or stopping your regular medicines. This is especially important if you are taking diabetes medicines or blood thinners.  ? Taking medicines such as aspirin and ibuprofen. These medicines can thin your blood. Do not take these medicines before your procedure if your health care provider instructs you not to.  · You may be given antibiotic medicine to help prevent infection.  · You may need to take blood thinners before surgery. If so, take these medicines as directed.  Staying hydrated  Follow instructions from your health care provider about hydration, which may include:  · Up to 2 hours before the procedure - you may continue to drink clear liquids, such as water, clear fruit juice, black coffee, and plain tea.  Eating and drinking restrictions  Follow instructions from your health care provider about eating and drinking, which may include:  · 8 hours before the procedure - stop eating heavy  meals or foods such as meat, fried foods, or fatty foods.  · 6 hours before the procedure - stop eating light meals or foods, such as toast or cereal.  · 6 hours before the procedure - stop drinking milk or drinks that contain milk.  · 2 hours before the procedure - stop drinking clear liquids.  General instructions  · Do not use any products that contain nicotine or tobacco for as long as possible before your procedure. These include cigarettes and e-cigarettes. If you need help quitting, ask your health care provider.  · You may have a blood or urine sample taken.  · You may have tests, such as:  ? Echocardiogram. This is an ultrasound scan of the heart.  ? CT scan.  ? Cardiac catheterization. During this procedure, a catheter is inserted into a blood vessel and guided into your heart to check pressures and take images of the heart.  ? Lung function tests.  · Plan to have someone take you home from the hospital.  What happens during the procedure?  · To lower your risk of infection:  ? Your health care team will wash or sanitize their hands.  ? Your skin will be washed with soap.  · IV tubes will be inserted into veins in your arms.  · You will be given one or more of the following:  ? A medicine to help you relax (sedative).  ? A medicine to numb the area (local anesthetic).  ? A medicine to make you fall asleep (general anesthetic).  · Small incisions will be made in one or more of the following places:  ? Your groin. This is the most common.  ? Your neck.  ? In your chest, over your breastbone (sternum).  ? The left side of your chest.  · Catheters will be threaded into your incisions and into blood vessels that lead to your heart. If your incision is in the left side of your chest, a catheter will be placed directly into your heart. Catheters will be used to monitor your heartbeat and regulate it if necessary (pacemaker).  · Ongoing X-ray images (fluoroscopy) will be used to guide the catheters to the right  places in your heart.  · A wire may be placed through the catheter inside your aortic valve. A balloon at the end of this catheter may be inflated to open your aortic valve. This wire will then be removed.  · Another wire will be placed through the catheter inside your aortic valve. The wire will be used to help position and inflate your artificial valve. Then, the wire will be removed.  · Tests will be done to make sure your new valve is working and your heart is functioning properly.  · Your incisions will be closed with stitches (sutures), skin glue, or adhesive strips.  · If you had an incision in your chest wall, you may have a chest tube placed to keep your lung from collapsing.  · Bandages (dressings) will be placed over your incisions.  The procedure may vary among health care providers and hospitals.  What happens after the procedure?  · Your blood pressure, heart rate, breathing rate, and blood oxygen level will be monitored.  · You may have to wear compression stockings. These stockings help to prevent blood clots and reduce swelling in your legs.  · You may need to lie still in bed for several hours. Once you are allowed to get up, you will be encouraged to move around.  · If you had a chest incision, you will have some chest pain. You will be given pain medicine as needed.  · You may continue to receive fluids and medicines through an IV tube.  · You will be given blood thinners.  · You will be shown how to do breathing exercises to prevent lung infection.  · You may continue to have a chest tube draining fluid from the surgical area.  · Do not drive for 24 hours if you were given a sedative.  Summary  · Transcatheter aortic valve replacement (TAVR) is a procedure to place an artificial aortic valve in the heart.  · You may need TAVR if you have a stiff aortic valve (aortic stenosis) that is causing symptoms and open heart valve replacement surgery is not an option for you.  · After the procedure, you  may need to lie still in bed for several hours. Once you are allowed to get up, you will be encouraged to move around.  This information is not intended to replace advice given to you by your health care provider. Make sure you discuss any questions you have with your health care provider.  Document Released: 11/06/2017 Document Revised: 04/08/2020 Document Reviewed: 11/06/2017  [employer] Patient Education © 2020 [employer]      Transcatheter Aortic Valve Replacement, Care After  This sheet gives you information about how to care for yourself after your procedure. Your health care provider may also give you more specific instructions. If you have problems or questions, contact your health care provider.  What can I expect after the procedure?  After the procedure, it is common to have:  · Pain around your incision areas.  · Bruising and a small lump near your catheter insertion area as it heals.  Follow these instructions at home:  Eating and drinking         · Follow instructions from your health care provider about eating or drinking restrictions.  ? Limit alcohol intake to no more than 1 drink per day for nonpregnant women and 2 drinks per day for men. One drink equals 12 oz of beer, 5 oz of wine, or 1½ oz of hard liquor.  ? Limit how much caffeine you drink. Caffeine can affect your heart's rate and rhythm.  · Eat a heart-healthy diet. This includes low-fat (lean) proteins, whole grains, and plenty of fresh fruits and vegetables. Avoid foods that are:  ? High in salt (sodium), saturated fat, or sugar. Check ingredients and nutrition facts on packaged foods and beverages.  ? Canned or highly processed.  ? Fried.  · Drink enough fluid to keep your urine pale yellow.  Activity  · Take naps and rest as needed throughout the day.  · Return to your normal activities as told by your health care provider. Ask your health care provider what activities are safe for you.  · Exercise regularly, as told by your health care  provider.  · Be sure to get up and move around one or more times every 1-2 hours. This helps to prevent blood clots.  · Avoid climbing stairs for about 1 week.  · Do not lift anything that is heavier than 10 lb (4.5 kg) until your health care provider approves.  Incision care    · Follow instructions from your health care provider about how to take care of your incisions. Make sure you:  ? Wash your hands with soap and water before you change your bandages (dressings). If soap and water are not available, use hand .  ? Change your dressings as told by your health care provider.  ? Leave stitches (sutures) or adhesive strips in place. These skin closures may need to stay in place for 2 weeks or longer. If adhesive strip edges start to loosen and curl up, you may trim the loose edges. Do not remove adhesive strips completely unless your health care provider tells you to do that.  · Check your incision areas every day for signs of infection. Check for:  ? Redness, swelling, or pain.  ? Fluid or blood.  ? Warmth.  ? Pus or a bad smell.  Medicines  · Take over-the-counter and prescription medicines only as told by your health care provider.  · If you were prescribed an antibiotic medicine, take it as told by your health care provider. Do not stop taking the antibiotic even if you start to feel better.  Travel  · Avoid airplane travel for as long as told by your health care provider.  · When you travel, bring a list of your medicines and a record of your medical history with you.  Driving  · Ask your health care provider when it is safe for you to drive. Do not drive until your health care provider approves.  · Do not drive for 24 hours if you were given a medicine to help you relax (sedative) during your procedure.  · Do not drive or use heavy machinery while taking prescription pain medicine, unless your health care provider approves.  Lifestyle  · Do not use any products that contain nicotine or tobacco, such  as cigarettes and e-cigarettes. If you need help quitting, ask your health care provider.  · Resume sexual activity as told by your health care provider. Do not use medicines for erectile dysfunction unless your health care provider approves, if this applies.  · Work with your health care provider to keep your blood pressure under control and to manage any other heart conditions that you have.  · Maintain a healthy weight.  General instructions  · Do not take baths, swim, or use a hot tub until your health care provider approves.  · Do not strain to have a bowel movement.  · To prevent or treat constipation while you are taking prescription pain medicine, your health care provider may recommend that you:  ? Take over-the-counter or prescription medicines.  ? Eat foods that are high in fiber, such as fresh fruits and vegetables, whole grains, and beans.  ? Limit foods that are high in fat and processed sugars, such as fried and sweet foods.  · Wear compression stockings as told by your health care provider. These stockings help to prevent blood clots and reduce swelling in your legs.  · Tell all of your health care providers that you have an artificial aortic valve. If you have or have had heart disease or endocarditis, tell all health care providers about these conditions as well.  · Keep all follow-up visits as told by your health care provider. This is important.  Contact a health care provider if:  · You develop a skin rash.  · You experience sudden, unexplained weight changes.  · You have redness, swelling, or pain around an incision area.  · Your incision feels warm to the touch.  · You have pus or a bad smell coming from an incision area.  · You have a fever.  Get help right away if:  · Your incision area swells very quickly.  · Your incision is bleeding, and the bleeding does not stop after holding pressure on the area.  · The area near or just beyond your incision tingles or becomes pale, cool, or  numb.  · You develop chest pain.  · You develop shortness of breath or have difficulty breathing.  These symptoms may represent a serious problem that is an emergency. Do not wait to see if the symptoms will go away. Get medical help right away. Call your local emergency services (911 in the [place] Do not drive yourself to the hospital.  Summary  · After the procedure, it is common to have discomfort and bruising around the catheter insertion site.  · Avoid climbing stairs for about 1 week after your procedure.  · Do not lift anything that is heavier than 10 lb (4.5 kg) until your health care provider approves.  · Tell all of your health care providers that you have an artificial aortic valve.  This information is not intended to replace advice given to you by your health care provider. Make sure you discuss any questions you have with your health care provider.  Document Released: 11/06/2017 Document Revised: 04/08/2020 Document Reviewed: 11/06/2017  [employer] Patient Education © 2020 [employer]      Depression / Suicide Risk    As you are discharged from this [facility] facility, it is important to learn how to keep safe from harming yourself.    Recognize the warning signs:  · Abrupt changes in personality, positive or negative- including increase in energy   · Giving away possessions  · Change in eating patterns- significant weight changes-  positive or negative  · Change in sleeping patterns- unable to sleep or sleeping all the time   · Unwillingness or inability to communicate  · Depression  · Unusual sadness, discouragement and loneliness  · Talk of wanting to die  · Neglect of personal appearance   · Rebelliousness- reckless behavior  · Withdrawal from people/activities they love  · Confusion- inability to concentrate     If you or a loved one observes any of these behaviors or has concerns about self-harm, here's what you can do:  · Talk about it- your feelings and reasons for harming  yourself  · Remove any means that you might use to hurt yourself (examples: pills, rope, extension cords, firearm)  · Get professional help from the community (Mental Health, Substance Abuse, psychological counseling)  · Do not be alone:Call your Safe Contact- someone whom you trust who will be there for you.  · Call your local CRISIS HOTLINE 448-3826 or 846-514-3146  · Call your local Children's Mobile Crisis Response Team [place] (316) 322-0781 or www.[employer]ik[employer]  · Call the toll free National Suicide Prevention Hotlines   · National Suicide Prevention Lifeline 271-464-PGQV (9636)  · National Hope Line Network 800-SUICIDE (121-1812)

## 2021-10-05 NOTE — PROGRESS NOTES
Patient discharged home with daughter. All personal belongings collected. IV access removed. Monitor removed, monitor room notified. Discharge instructions discussed. Medications reviewed; med's to bed delivered. TAVR education completed. TAVR card given to daughter on 10/4/21. Follow up appointments scheduled. Patient escorted off unit via wheelchair without incident.

## 2021-10-06 LAB — LV EJECT FRACT  99904: 55

## 2021-10-06 NOTE — DISCHARGE SUMMARY
PRIMARY DISCHARGE DIAGNOSIS: Status post transcatheter aortic valve replacement.    DISCHARGE DIAGNOSIS:  S/P TAVR    PROCEDURES/TESTIN. Successful transcatheter aortic valve replacement (TAVR) with #29 +1 mlEdwards Jose C 3 ultra valve, transfemoral approach under moderate sedation on 10/4/21  2. Intraoperative transesophageal echocardiogram showing results pending  3. Echocardiogram on 10/5/21 showing:  Known TAVR aortic valve that is functioning normally with normal   transvalvular gradients.  Transvalvular gradients are - Peak:24.56   mmHg, Mean:12.26   mmHg.  Hyperdynamic left ventricular systolic function.  The left ventricular ejection fraction is visually estimated to be   greater than 70%.  Grade I diastolic dysfunction.  Mild concentric left ventricular hypertrophy.  Normal right ventricular size and systolic function.  Compared to the previous transthoracic echocardiogram performed on   2021: The aortic valve has been replaced. The LVEF has increased   from 65% to 75%.  4. CXR on 10/5/21: unremarkable  5. EKG on 10/5/21 SR rate of 85  6. Labs   Lab Results   Component Value Date/Time    SODIUM 142 10/05/2021 01:27 AM    POTASSIUM 4.2 10/05/2021 01:27 AM    CHLORIDE 105 10/05/2021 01:27 AM    CO2 23 10/05/2021 01:27 AM    GLUCOSE 116 (H) 10/05/2021 01:27 AM    BUN 17 10/05/2021 01:27 AM    CREATININE 1.15 10/05/2021 01:27 AM       Lab Results   Component Value Date/Time    PROTHROMBTM 13.5 2021 09:34 AM    INR 1.06 2021 09:34 AM       Lab Results   Component Value Date/Time    WBC 11.5 (H) 10/05/2021 01:27 AM    RBC 4.24 (L) 10/05/2021 01:27 AM    HEMOGLOBIN 13.5 (L) 10/05/2021 01:27 AM    HEMATOCRIT 39.4 (L) 10/05/2021 01:27 AM    MCV 92.9 10/05/2021 01:27 AM    MCH 31.8 10/05/2021 01:27 AM    MCHC 34.3 10/05/2021 01:27 AM    MPV 9.8 10/05/2021 01:27 AM    NEUTSPOLYS 69.60 2021 09:34 AM    LYMPHOCYTES 16.90 (L) 2021 09:34 AM    MONOCYTES 11.30 2021 09:34 AM     "EOSINOPHILS 1.10 09/27/2021 09:34 AM    BASOPHILS 0.70 09/27/2021 09:34 AM       HOSPITAL COURSE: The patient is a pleasant 66 year old male with severe symptomatic aortic stenosis, HTN, HLD, mild carotid disease, enlarged prostate, GERD, diverticulosis, and anxiety. Due to the patient's symptoms, the patient underwent successful TAVR described as above. Post-procedure, the patient did well. They didn't require IV diuresis during their stay. He did experience some post-operative facial flushing associated with sinus tachycardia, anxiety medications were given and his symptoms improved. Highly encourage PCP involvement in anxiety management. Patient also continues to have abdominal \"pinching\" that has been worked up by GI with no obvious findings. Most of his symptoms have been since the death of his wife. They were able to ambulate without difficulty. No further events were noted during their stay. They are now off oxygen and are to be discharged to home with his daughter.    DISCHARGE MEDICATIONS:      Medication List      START taking these medications      Instructions   Adult Aspirin Regimen 81 MG EC tablet  Generic drug: aspirin   Take 1 Tablet by mouth every day.  Dose: 81 mg     LORazepam 0.5 MG Tabs  Commonly known as: ATIVAN   Take 0.5-1 Tablets by mouth 2 times a day as needed (sleep or anxiety) for up to 10 days.  Dose: 0.25-0.5 mg        CHANGE how you take these medications      Instructions   amLODIPine 2.5 MG Tabs  What changed: when to take this  Commonly known as: NORVASC   TAKE 1 TABLET BY MOUTH EVERY DAY     simvastatin 10 MG Tabs  What changed:   · when to take this  · additional instructions  Commonly known as: ZOCOR   Take 1 Tab by mouth every day. N THE EVENING  Dose: 10 mg        CONTINUE taking these medications      Instructions   famotidine 20 MG Tabs  Commonly known as: PEPCID   Take 20 mg by mouth 2 times a day.  Dose: 20 mg     MYLANTA PO   Take 30 mL by mouth 2 times a day as needed " (For upset stomach).  Dose: 30 mL     tamsulosin 0.4 MG capsule  Commonly known as: FLOMAX   Take 1 Capsule by mouth every evening.  Dose: 0.4 mg     VITAMIN D PO   Take 1,000 Int'l Units by mouth every day.  Dose: 1,000 Int'l Units          DISCHARGE INSTRUCTIONS: They are given discharge instructions on potential post-operative complications and symptoms to watch out for. Their groin sites were checked and were clean, dry, and intact. Patient or family to notify us for any complications noted on the discharge instructions. They will follow up with myself, Patricia GÓMEZ, on Monday in our cardiology office. They will not get labs before their follow up appointment. They will then follow up with Dr. Alfonso with a repeat echocardiogram in one month for post TAVR assessment.      Future Appointments   Date Time Provider Department Center   10/8/2021 10:30 AM [facility] [place]   10/11/2021  1:00 PM MIRA [facility]. [facility] None   11/3/2021  9:00 AM Ric Figueredo M.D. [facility] None   2/18/2022  8:30 AM [facility] [place]

## 2021-10-07 LAB
ACT BLD: 109 SEC (ref 74–137)
ACT BLD: 230 SEC (ref 74–137)
BASE EXCESS BLDA CALC-SCNC: -1 MMOL/L (ref -4–3)
BODY TEMPERATURE: ABNORMAL DEGREES
CA-I BLD ISE-SCNC: 1.29 MMOL/L (ref 1.1–1.3)
CO2 BLDA-SCNC: 31 MMOL/L (ref 20–33)
HCO3 BLDA-SCNC: 29.2 MMOL/L (ref 17–25)
HCT VFR BLD CALC: 38 % (ref 42–52)
HGB BLD-MCNC: 12.9 G/DL (ref 14–18)
PCO2 BLDA: 73.3 MMHG (ref 26–37)
PH BLDA: 7.21 [PH] (ref 7.4–7.5)
PO2 BLDA: 117 MMHG (ref 64–87)
POTASSIUM BLD-SCNC: 4 MMOL/L (ref 3.6–5.5)
SAO2 % BLDA: 97 % (ref 93–99)
SODIUM BLD-SCNC: 142 MMOL/L (ref 135–145)
SPECIMEN DRAWN FROM PATIENT: ABNORMAL

## 2021-10-08 ENCOUNTER — TELEMEDICINE (OUTPATIENT)
Dept: MEDICAL GROUP | Facility: MEDICAL CENTER | Age: 66
End: 2021-10-08
Payer: MEDICARE

## 2021-10-08 VITALS
SYSTOLIC BLOOD PRESSURE: 131 MMHG | TEMPERATURE: 98 F | DIASTOLIC BLOOD PRESSURE: 82 MMHG | HEIGHT: 70 IN | WEIGHT: 165 LBS | HEART RATE: 86 BPM | BODY MASS INDEX: 23.62 KG/M2

## 2021-10-08 DIAGNOSIS — Z95.2 S/P TAVR (TRANSCATHETER AORTIC VALVE REPLACEMENT): ICD-10-CM

## 2021-10-08 DIAGNOSIS — F41.9 ANXIETY: ICD-10-CM

## 2021-10-08 DIAGNOSIS — R23.2 FLUSHING: ICD-10-CM

## 2021-10-08 PROCEDURE — 99214 OFFICE O/P EST MOD 30 MIN: CPT | Mod: 95 | Performed by: FAMILY MEDICINE

## 2021-10-08 RX ORDER — SERTRALINE HYDROCHLORIDE 25 MG/1
25 TABLET, FILM COATED ORAL DAILY
Qty: 30 TABLET | Refills: 0 | Status: SHIPPED | OUTPATIENT
Start: 2021-10-08 | End: 2021-10-12

## 2021-10-08 ASSESSMENT — FIBROSIS 4 INDEX: FIB4 SCORE: 2.76

## 2021-10-08 NOTE — PROGRESS NOTES
Virtual Visit: Established Patient   This visit was conducted via Zoom using secure and encrypted videoconferencing technology.   The patient was in a private location in the [facility].    The patient's identity was confirmed and verbal consent was obtained for this virtual visit.    Subjective:   CC:   Chief Complaint   Patient presents with   • Follow-Up     ER Visit      Brennan Farooq Jr. is a 66 y.o. male presenting for evaluation and management of:    Flushing  Back in December the patient developed left upper quadrant abdominal discomfort.  This persisted after omeprazole and I sent the patient for a CT scan which was reassuring.  The pain improved but he later developed generalized abdominal discomfort. An x-ray showed a possibility of constipation.  He was treated with magnesium citrate which did not help. He was referred to GI who prescribed omeprazole and performed an EGD and colonoscopy which was reassuring overall. He has since stopped the omeprazole. He later developed chest pain, flushing, bloating, palpitations, near syncope, orthostasis, and an episode of cold sweats and nausea. He also reported unintended weight loss. He went to the ER where a workup was unrevealing. He did have a troponin drawn which was normal. He has followed up with cardiology.  His cancer screening is up-to-date with a normal PSA in February and recent normal colonoscopy.  He also had a CT scan of the abdomen pelvis and chest x-ray which were reassuring overall.  He did recently have an echocardiogram performed by his cardiologist showing severe aortic stenosis and is now s/p repair.  He also describes anxiety. His symptoms persist. Mirtazapine seems to be helping the flushing but his abdominal pain persists. GI recommended simethicone which he just started. GI also recommended pepcid, which doesn't seem to help and may make the gas worse. He does feel a bit groggy in the morning after the mirtazapine.     S/P TAVR  "(transcatheter aortic valve replacement)  Recovering well from the surgery.     Anxiety  See \"flushing\". Mirtazapine and ativan seems to help.       Current medicines (including changes today)  Current Outpatient Medications   Medication Sig Dispense Refill   • sertraline (ZOLOFT) 25 MG tablet Take 1 Tablet by mouth every day. 30 Tablet 0   • aspirin 81 MG EC tablet Take 1 Tablet by mouth every day. 30 Tablet 11   • LORazepam (ATIVAN) 0.5 MG Tab Take 0.5-1 Tablets by mouth 2 times a day as needed (sleep or anxiety) for up to 10 days. 20 Tablet 0   • Alum & Mag Hydroxide-Simeth (MYLANTA PO) Take 30 mL by mouth 2 times a day as needed (For upset stomach).     • tamsulosin (FLOMAX) 0.4 MG capsule Take 1 Capsule by mouth every evening. 90 Capsule 0   • VITAMIN D PO Take 1,000 Int'l Units by mouth every day.     • amLODIPine (NORVASC) 2.5 MG Tab TAKE 1 TABLET BY MOUTH EVERY DAY (Patient taking differently: Take 2.5 mg by mouth every day.) 100 tablet 4   • simvastatin (ZOCOR) 10 MG Tab Take 1 Tab by mouth every day. N THE EVENING (Patient taking differently: Take 10 mg by mouth every evening.) 90 Tab 3     No current facility-administered medications for this visit.       Patient Active Problem List    Diagnosis Date Noted   • Diverticulosis 09/24/2021   • Enlarged prostate 09/24/2021   • Bladder wall thickening 09/24/2021   • Mild carotid artery disease (HCC) 09/23/2021   • Flushing 08/20/2021   • Anxiety 08/20/2021   • Actinic keratoses 03/31/2021   • Dysfunction of both eustachian tubes 12/18/2018   • S/P TAVR (transcatheter aortic valve replacement) 12/18/2018   • Impaired fasting glucose 12/18/2018   • Dyslipidemia 11/08/2018   • Gastroesophageal reflux disease without esophagitis 11/05/2018   • Dupuytren's contracture 11/05/2018   • Bilateral hand pain 11/05/2018   • Essential hypertension 11/05/2018        Objective:   /82 (BP Location: Left arm, Patient Position: Sitting, BP Cuff Size: Adult) Comment: Pt " "stated  Pulse 86 Comment: Pt stated  Temp 36.7 °C (98 °F) (Temporal) Comment: Pt stated  Ht 1.778 m (5' 10\") Comment: Pt stated  Wt 74.8 kg (165 lb) Comment: Pt stated  BMI 23.68 kg/m²     Physical Exam:  Psych: anxious appearing.     Assessment and Plan:   The following treatment plan was discussed:     1. Anxiety  I think that many of the patient's symptoms are being mediated by anxiety. He'll stop mirtazapine, continue judicious PRN ativan and start sertraline. I'll f/u with him in 3 weeks. He'll also stop pepcid but continue simethicone. He has seen GI.   - sertraline (ZOLOFT) 25 MG tablet; Take 1 Tablet by mouth every day.  Dispense: 30 Tablet; Refill: 0    2. Flushing  - see above.     3. S/P TAVR (transcatheter aortic valve replacement)  - doing well.     Follow-up: Return in about 3 weeks (around 10/29/2021), or if symptoms worsen or fail to improve.         "

## 2021-10-08 NOTE — ASSESSMENT & PLAN NOTE
Back in December the patient developed left upper quadrant abdominal discomfort.  This persisted after omeprazole and I sent the patient for a CT scan which was reassuring.  The pain improved but he later developed generalized abdominal discomfort. An x-ray showed a possibility of constipation.  He was treated with magnesium citrate which did not help. He was referred to GI who prescribed omeprazole and performed an EGD and colonoscopy which was reassuring overall. He has since stopped the omeprazole. He later developed chest pain, flushing, bloating, palpitations, near syncope, orthostasis, and an episode of cold sweats and nausea. He also reported unintended weight loss. He went to the ER where a workup was unrevealing. He did have a troponin drawn which was normal. He has followed up with cardiology.  His cancer screening is up-to-date with a normal PSA in February and recent normal colonoscopy.  He also had a CT scan of the abdomen pelvis and chest x-ray which were reassuring overall.  He did recently have an echocardiogram performed by his cardiologist showing severe aortic stenosis and is now s/p repair.  He also describes anxiety. His symptoms persist. Mirtazapine seems to be helping the flushing but his abdominal pain persists. GI recommended simethicone which he just started. GI also recommended pepcid, which doesn't seem to help and may make the gas worse. He does feel a bit groggy in the morning after the mirtazapine.

## 2021-10-11 ENCOUNTER — PATIENT MESSAGE (OUTPATIENT)
Dept: MEDICAL GROUP | Facility: MEDICAL CENTER | Age: 66
End: 2021-10-11

## 2021-10-11 ENCOUNTER — OFFICE VISIT (OUTPATIENT)
Dept: CARDIOLOGY | Facility: MEDICAL CENTER | Age: 66
End: 2021-10-11
Payer: MEDICARE

## 2021-10-11 VITALS
DIASTOLIC BLOOD PRESSURE: 80 MMHG | SYSTOLIC BLOOD PRESSURE: 140 MMHG | HEIGHT: 70 IN | OXYGEN SATURATION: 96 % | HEART RATE: 85 BPM | WEIGHT: 164 LBS | BODY MASS INDEX: 23.48 KG/M2 | RESPIRATION RATE: 16 BRPM

## 2021-10-11 DIAGNOSIS — R73.01 IMPAIRED FASTING GLUCOSE: ICD-10-CM

## 2021-10-11 DIAGNOSIS — I10 ESSENTIAL HYPERTENSION: ICD-10-CM

## 2021-10-11 DIAGNOSIS — Z95.2 S/P TAVR (TRANSCATHETER AORTIC VALVE REPLACEMENT): ICD-10-CM

## 2021-10-11 DIAGNOSIS — I35.0 SEVERE AORTIC STENOSIS: ICD-10-CM

## 2021-10-11 DIAGNOSIS — I77.9 MILD CAROTID ARTERY DISEASE (HCC): ICD-10-CM

## 2021-10-11 DIAGNOSIS — E78.5 DYSLIPIDEMIA: ICD-10-CM

## 2021-10-11 LAB — EKG IMPRESSION: NORMAL

## 2021-10-11 PROCEDURE — 99214 OFFICE O/P EST MOD 30 MIN: CPT | Performed by: NURSE PRACTITIONER

## 2021-10-11 PROCEDURE — 93000 ELECTROCARDIOGRAM COMPLETE: CPT | Performed by: STUDENT IN AN ORGANIZED HEALTH CARE EDUCATION/TRAINING PROGRAM

## 2021-10-11 ASSESSMENT — ENCOUNTER SYMPTOMS
COUGH: 0
ABDOMINAL PAIN: 0
PND: 0
DIZZINESS: 0
CLAUDICATION: 0
MYALGIAS: 0
SHORTNESS OF BREATH: 0
PALPITATIONS: 0
ORTHOPNEA: 0
FEVER: 0

## 2021-10-11 ASSESSMENT — FIBROSIS 4 INDEX: FIB4 SCORE: 2.76

## 2021-10-11 NOTE — PROGRESS NOTES
Chief Complaint   Patient presents with   • Aortic Stenosis     F/V Dx: Severe aortic stenosis. S/P TAVR (transcatheter aortic valve replacement)   • Hypertension     F/V Dx: Essential hypertension   • Dyslipidemia       Subjective     Brennan Farooq Jr. is a 66 y.o. male who presents today for one week post TAVR.    He is a patient of Dr. Figueredo in our office.     Hx of severe AS now S/P TAVR, mild carotid disease, HTN, HLD, impaired fasting glucose, diverticulosis, bladder wall thickening, prostate enlargement, dupuytren's contracture, and unexplained flushing/skin tingling and warmth associated with multiple abdominal symptoms at times (GERD, gas, bloating, and abdominal discomfort with movement).     He is here with his daughter. He saw his PCP post-op and his PCP is still thinking he has anxiety, unfortunately he is not tolerating the anxiety/depression medications very well. He will continue to work with his PCP on this.    He has no cardiac symptoms to report and overall feels well post-op. He has noticed a slight increase in his BP medication.    He has no chest pain, shortness of breath, edema, dizziness/lightheadedness, or palpitations.     Past Medical History:   Diagnosis Date   • Breath shortness 09/27/2021    with exertion   • Dyslipidemia    • GERD (gastroesophageal reflux disease)    • Heart burn     pt with diverticulltis   • High cholesterol    • Hypertension 09/27/2021    pt states well controlled   • Indigestion    • Moderate aortic stenosis    • Pneumonia 2016   • Snoring      Past Surgical History:   Procedure Laterality Date   • PB ECHO HEART,TRANSESOPHAGEAL,COMPLETE N/A 10/4/2021    Procedure: ECHOCARDIOGRAM,TRANSTHORACIC,INTRAOPERATIVE;  Surgeon: Akash Alfonso M.D.;  Location: SURGERY [facility];  Service: Cardiac   • TRANSCATHETER AORTIC VALVE REPLACEMENT Bilateral 10/4/2021    Procedure: REPLACEMENT, AORTIC VALVE, TRANSCATHETER;  Surgeon: Akash Alfonso M.D.;  Location:  [facility]URG[facility];  Service: Cardiac     Family History   Problem Relation Age of Onset   • Arthritis Mother    • Alcohol/Drug Mother    • Alcohol/Drug Father    • Hypertension Father    • Other Father 46        brain aneurysm rupture   • Heart Disease Neg Hx      Social History     Socioeconomic History   • Marital status:      Spouse name: Not on file   • Number of children: Not on file   • Years of education: Not on file   • Highest education level: Not on file   Occupational History   • Not on file   Tobacco Use   • Smoking status: Never Smoker   • Smokeless tobacco: Never Used   Vaping Use   • Vaping Use: Never used   Substance and Sexual Activity   • Alcohol use: Not Currently     Alcohol/week: 0.6 oz     Types: 1 Standard drinks or equivalent per week     Comment: Rare   • Drug use: No   • Sexual activity: Yes     Comment: Businessman   Other Topics Concern   • Not on file   Social History Narrative    Self-employed [employer].      Social Determinants of Health     Financial Resource Strain:    • Difficulty of Paying Living Expenses:    Food Insecurity:    • Worried About Running Out of Food in the Last Year:    • Ran Out of Food in the Last Year:    Transportation Needs:    • Lack of Transportation (Medical):    • Lack of Transportation (Non-Medical):    Physical Activity:    • Days of Exercise per Week:    • Minutes of Exercise per Session:    Stress:    • Feeling of Stress :    Social Connections:    • Frequency of Communication with Friends and Family:    • Frequency of Social Gatherings with Friends and Family:    • Attends Congregational Services:    • Active Member of Clubs or Organizations:    • Attends Club or Organization Meetings:    • Marital Status:    Intimate Partner Violence:    • Fear of Current or Ex-Partner:    • Emotionally Abused:    • Physically Abused:    • Sexually Abused:      No Known Allergies  Outpatient Encounter Medications as of 10/11/2021   Medication Sig Dispense  "Refill   • sertraline (ZOLOFT) 25 MG tablet Take 1 Tablet by mouth every day. 30 Tablet 0   • aspirin 81 MG EC tablet Take 1 Tablet by mouth every day. 30 Tablet 11   • LORazepam (ATIVAN) 0.5 MG Tab Take 0.5-1 Tablets by mouth 2 times a day as needed (sleep or anxiety) for up to 10 days. 20 Tablet 0   • Alum & Mag Hydroxide-Simeth (MYLANTA PO) Take 30 mL by mouth 2 times a day as needed (For upset stomach).     • tamsulosin (FLOMAX) 0.4 MG capsule Take 1 Capsule by mouth every evening. 90 Capsule 0   • VITAMIN D PO Take 1,000 Int'l Units by mouth every day.     • amLODIPine (NORVASC) 2.5 MG Tab TAKE 1 TABLET BY MOUTH EVERY DAY (Patient taking differently: Take 2.5 mg by mouth every day.) 100 tablet 4   • simvastatin (ZOCOR) 10 MG Tab Take 1 Tab by mouth every day. N THE EVENING (Patient taking differently: Take 10 mg by mouth every evening.) 90 Tab 3     No facility-administered encounter medications on file as of 10/11/2021.     Review of Systems   Constitutional: Negative for fever and malaise/fatigue.   Respiratory: Negative for cough and shortness of breath.    Cardiovascular: Negative for chest pain, palpitations, orthopnea, claudication, leg swelling and PND.   Gastrointestinal: Negative for abdominal pain.   Musculoskeletal: Negative for myalgias.   Neurological: Negative for dizziness.        \"Intermittent flushing symptoms associated with abdominal discomfort and skin feeling tingly and hot\"              Objective     BP (!) 0/0 (BP Location: Left arm, Patient Position: Sitting, BP Cuff Size: Adult)     Physical Exam  Vitals and nursing note reviewed.   Constitutional:       Appearance: Normal appearance. He is well-developed and normal weight.   HENT:      Head: Normocephalic and atraumatic.   Neck:      Vascular: No JVD.   Cardiovascular:      Rate and Rhythm: Normal rate and regular rhythm.      Pulses: Normal pulses.      Heart sounds: Normal heart sounds.   Pulmonary:      Effort: Pulmonary effort is " normal.      Breath sounds: Normal breath sounds.   Musculoskeletal:         General: Normal range of motion.   Skin:     General: Skin is warm and dry.      Capillary Refill: Capillary refill takes less than 2 seconds.   Neurological:      General: No focal deficit present.      Mental Status: He is alert and oriented to person, place, and time. Mental status is at baseline.   Psychiatric:         Mood and Affect: Mood normal.         Behavior: Behavior normal.         Thought Content: Thought content normal.         Judgment: Judgment normal.                Assessment & Plan     1. S/P TAVR (transcatheter aortic valve replacement)     2. Severe aortic stenosis         Medical Decision Making: Today's Assessment/Status/Plan:     1. S/P TAVR  -doing well post-op  -cont asa lifelong  -groins CDI with mild bruising and small nodule   -SBE prophylaxis understands  -echo 1 month with structural heart follow up  -reviewed hospital imaging, labs, and EKG  -cardiac rehab referral placed  -EKG shows SR    2. Unexplained flushing, skin tingling, and intermittent abdominal symptoms  -symptoms most likely related to anxiety  -his PCP is working up these symptoms    3. HTN  -moderate control  -OK to increase amlodipine to 5 mg QPM  -BP goal <130/80  -watch at home and call or my chart us if BP elevates    4. HLD with mild carotid disease  -statin  -LDL goal <70 with mild carotid disease  -follow annual labs    Patient is to follow up with Dr. Alfonso in 1 month with echo, 3 months with Dr. Figueredo.

## 2021-10-12 RX ORDER — MIRTAZAPINE 15 MG/1
15 TABLET, FILM COATED ORAL
Qty: 30 TABLET | Refills: 0
Start: 2021-10-12

## 2021-10-18 ENCOUNTER — TELEPHONE (OUTPATIENT)
Dept: SCHEDULING | Facility: IMAGING CENTER | Age: 66
End: 2021-10-18

## 2021-10-18 NOTE — TELEPHONE ENCOUNTER
SC        Pt's daughter Meesha called back from AK's recent call. The pt's daughter Meesah wanted AK to called her other sister Randi instead regarding the pt.      Best contact for pt's daughter Randi  PH: [phone]      Thank you,  Steff CRAVEN

## 2021-10-18 NOTE — TELEPHONE ENCOUNTER
Yes, he unfortunately committed suicide. Naty called both daughters this morning to give his condolences. We will send the card as planned to his family. SC

## (undated) DEVICE — SUTURE 5 SURGICAL STEEL V-40 - (12/BX) CCS CURRENT

## (undated) DEVICE — BLADE SURGICAL #11 - (50/BX)

## (undated) DEVICE — GUIDEWIRE STARTER STRAIGHT FIXED CORE .035 150CM 4 STRAIGHT PTFE/HEPARIN COATED (10/BX)

## (undated) DEVICE — ELECTRODE DUAL RETURN W/ CORD - (50/PK)

## (undated) DEVICE — GUIDEWIRE STARTER J CURVED FIXED CORE .035 260CM 10 3MM PTFE/HEPARIN COATED (5EA/BX)

## (undated) DEVICE — SLEEVE, VASO, THIGH, MED

## (undated) DEVICE — SUTURE GENERAL

## (undated) DEVICE — LACTATED RINGERS INJ 1000 ML - (14EA/CA 60CA/PF)

## (undated) DEVICE — TRANSDUCER BIFURCATED MONITORING KIT (10EA/CA)

## (undated) DEVICE — DRAPE MAYO STAND - (30/CA)

## (undated) DEVICE — TOWEL STOP TIMEOUT SAFETY FLAG (40EA/CA)

## (undated) DEVICE — SUCTION INSTRUMENT YANKAUER OPEN TIP W/O VENT (50EA/CA)

## (undated) DEVICE — DECANTER FLD BLS - (50/CA)

## (undated) DEVICE — CHLORAPREP 26 ML APPLICATOR - ORANGE TINT(25/CA)

## (undated) DEVICE — SENSOR SPO2 NEO LNCS ADHESIVE (20/BX) SEE USER NOTES

## (undated) DEVICE — SUTURE OHS

## (undated) DEVICE — INTRODUCER CATHETER  DILATOR PROTRUDING 8FR 2.5CM (10EA/BX)

## (undated) DEVICE — ELECTRODE DFBR ADLT 6.5X5IN (12PR/CA) *8900-4003 PART # FOR CA QTY. PART #8900-4004 IS EACH QTY

## (undated) DEVICE — SUTURE  0 ETHIBOND CT-1 30 IN (36PK/BX)

## (undated) DEVICE — CABLE TEMPORARY PACING

## (undated) DEVICE — NEEDLE PERCUTANEOUS THINWALL 7CM 18GA BSDN 18-7.0

## (undated) DEVICE — MASK ANESTHESIA ADULT  - (100/CA)

## (undated) DEVICE — DRESSING TRANSPARENT FILM TEGADERM 4 X 4.75" (50EA/BX)"

## (undated) DEVICE — MICRODRIP PRIMARY VENTED 60 (48EA/CA) THIS WAS PART #2C8428 WHICH WAS DISCONTINUED

## (undated) DEVICE — GLOVE BIOGEL SZ 7.5 SURGICAL PF LTX - (50PR/BX 4BX/CA)

## (undated) DEVICE — WIRE GUIDE AES .035 260CM WITH 3MM J TIP"

## (undated) DEVICE — SET INTRO MIRCROPUNCTURE - MPIS-501-SST

## (undated) DEVICE — GOWN WARMING STANDARD FLEX - (30/CA)

## (undated) DEVICE — BLADE SURGICAL #15 - (50/BX 3BX/CA)

## (undated) DEVICE — STOPCOCK IV 400 PSI 3W ROT (50EA/BX)

## (undated) DEVICE — BAG RESUSCITATION DISPOSABLE - WITH MASK (10 EA/CA)

## (undated) DEVICE — KIT RADIAL ARTERY 20GA W/MAX BARRIER AND BIOPATCH  (5EA/CA) #10740 IS FOR THE SET RADIAL ARTERIAL

## (undated) DEVICE — SODIUM CHL IRRIGATION 0.9% 1000ML (12EA/CA)

## (undated) DEVICE — TUBING CLEARLINK DUO-VENT - C-FLO (48EA/CA)

## (undated) DEVICE — ELECTRODE RADIOLUCNT SOLID GEL DEFIB PADS (12EA/CA)

## (undated) DEVICE — CATHETER TROCAR 28FR.

## (undated) DEVICE — NEPTUNE 4 PORT MANIFOLD - (20/PK)

## (undated) DEVICE — TUBE CONNECT SUCTION CLEAR 120 X 1/4" (50EA/CA)"

## (undated) DEVICE — PACK TAVR (3EA/CA)

## (undated) DEVICE — GOWN SURGEONS X-LARGE - DISP. (30/CA)

## (undated) DEVICE — COVER FOOT UNIVERSAL DISP. - (25EA/CA)

## (undated) DEVICE — PROTECTOR ULNA NERVE - (36PR/CA)

## (undated) DEVICE — BLANKET UNDERBODY FULL ACCES - (5/CA)

## (undated) DEVICE — DRAPE LAPAROTOMY T SHEET - (12EA/CA)

## (undated) DEVICE — GLOVE BIOGEL INDICATOR SZ 8 SURGICAL PF LTX - (50/BX 4BX/CA)

## (undated) DEVICE — SYSTEM CHEST DRAIN ADULT/PEDS W/AUTO TRANSFUSION CAPABILITY SAHARA (6EA/CA)

## (undated) DEVICE — SET EXTENSION WITH 2 PORTS (48EA/CA) ***PART #2C8610 IS A SUBSTITUTE*****

## (undated) DEVICE — SOD. CHL. INJ. 0.9% 1000 ML - (14EA/CA 60CA/PF)

## (undated) DEVICE — ARM BAND RADIAL TR BAND (5EA/BX)

## (undated) DEVICE — CATHETER PIGTAIL 6FR 145 (5EA/BX)

## (undated) DEVICE — HEAD HOLDER JUNIOR/ADULT

## (undated) DEVICE — SET LEADWIRE 5 LEAD BEDSIDE DISPOSABLE ECG (1SET OF 5/EA)

## (undated) DEVICE — DEVICE CLOSER PERCLOSE - (10/BX) PROGLIDE SYSTEM

## (undated) DEVICE — INTRODUCER SHEATH 6FR 2.5CM - DILATOR PROTRUDING (10/BX)

## (undated) DEVICE — GLOVE BIOGEL SZ 8.5 SURGICAL PF LTX - (50PR/BX 4BX/CA)

## (undated) DEVICE — CANISTER SUCTION 3000ML MECHANICAL FILTER AUTO SHUTOFF MEDI-VAC NONSTERILE LF DISP  (40EA/CA)

## (undated) DEVICE — SODIUM CHL. INJ. 0.9% 500ML (24EA/CA 50CA/PF)

## (undated) DEVICE — COVER LIGHT HANDLE FLEXIBLE - SOFT (2EA/PK 80PK/CA)

## (undated) DEVICE — SET FLUID WARMING STANDARD FLOW - (10/CA)

## (undated) DEVICE — SUCTION INSTRUMENT YANKAUER BULBOUS TIP W/O VENT (50EA/CA)

## (undated) DEVICE — WIRE GUIDE LUNDQST.035X180 - TSMG-35-180-4-LES ORDER BY BOX (5EA/BX)

## (undated) DEVICE — GLIDESHEATH SLENDER NITINOL KIT .021 GW 6FR 10CM SINGLE WALL

## (undated) DEVICE — CONTAINER, SPECIMEN, STERILE

## (undated) DEVICE — KIT ANESTHESIA W/CIRCUIT & 3/LT BAG W/FILTER (20EA/CA)

## (undated) DEVICE — SET BIFURCATED BLOOD - (48EA/CS)

## (undated) DEVICE — PACK MAJOR BASIN - (2EA/CA)

## (undated) DEVICE — SPONGE DRAIN 4 X 4IN 6-PLY - (2/PK25PK/BX12BX/CS)

## (undated) DEVICE — DERMABOND ADVANCED - (12EA/BX)

## (undated) DEVICE — ELECTRODE 850 FOAM ADHESIVE - HYDROGEL RADIOTRNSPRNT (50/PK)

## (undated) DEVICE — TUBING PRSS MNTR 72IN M/ M LL - (25/BX) MONIT. LINE W/MALE L/L

## (undated) DEVICE — SLEEVE VASO CALF MED - (10PR/CA)

## (undated) DEVICE — CATHETER 6FR AL1 100CM (5/BX)

## (undated) DEVICE — GLOVE SZ 8 BIOGEL PI MICRO - PF LF (50PR/BX)

## (undated) DEVICE — STOPCOCK 3-WAY W/SWIVEL LEVER LOCK (50EA/CA)

## (undated) DEVICE — ARMBOARD  SMALL IV 9 INLONG - (25EA/CA)

## (undated) DEVICE — DRAPE CLEAR W/ELASTIC BAND RAD CARM 40 X40" (20EA/CA)"

## (undated) DEVICE — BOVIE BLADE COATED - (50/PK)

## (undated) DEVICE — SYS DLV COST CLS RM TEMP - INJECTATE (CO-SET II) (10EA/CA)